# Patient Record
Sex: MALE | Race: BLACK OR AFRICAN AMERICAN | NOT HISPANIC OR LATINO | URBAN - METROPOLITAN AREA
[De-identification: names, ages, dates, MRNs, and addresses within clinical notes are randomized per-mention and may not be internally consistent; named-entity substitution may affect disease eponyms.]

---

## 2017-01-11 ENCOUNTER — INPATIENT (INPATIENT)
Facility: HOSPITAL | Age: 57
LOS: 0 days | Discharge: HOME | End: 2017-01-12
Attending: INTERNAL MEDICINE | Admitting: INTERNAL MEDICINE
Payer: COMMERCIAL

## 2017-01-11 VITALS
HEART RATE: 74 BPM | OXYGEN SATURATION: 98 % | RESPIRATION RATE: 16 BRPM | DIASTOLIC BLOOD PRESSURE: 121 MMHG | TEMPERATURE: 98 F | SYSTOLIC BLOOD PRESSURE: 177 MMHG

## 2017-01-11 DIAGNOSIS — I20.0 UNSTABLE ANGINA: ICD-10-CM

## 2017-01-11 DIAGNOSIS — Z41.9 ENCOUNTER FOR PROCEDURE FOR PURPOSES OTHER THAN REMEDYING HEALTH STATE, UNSPECIFIED: Chronic | ICD-10-CM

## 2017-01-11 DIAGNOSIS — Z41.8 ENCOUNTER FOR OTHER PROCEDURES FOR PURPOSES OTHER THAN REMEDYING HEALTH STATE: ICD-10-CM

## 2017-01-11 DIAGNOSIS — E11.9 TYPE 2 DIABETES MELLITUS WITHOUT COMPLICATIONS: ICD-10-CM

## 2017-01-11 DIAGNOSIS — R07.9 CHEST PAIN, UNSPECIFIED: ICD-10-CM

## 2017-01-11 DIAGNOSIS — N40.0 BENIGN PROSTATIC HYPERPLASIA WITHOUT LOWER URINARY TRACT SYMPTOMS: Chronic | ICD-10-CM

## 2017-01-11 LAB
ALBUMIN SERPL ELPH-MCNC: 4 G/DL — SIGNIFICANT CHANGE UP (ref 3.3–5)
ALP SERPL-CCNC: 89 U/L — SIGNIFICANT CHANGE UP (ref 40–120)
ALT FLD-CCNC: 25 U/L — SIGNIFICANT CHANGE UP (ref 4–41)
APTT BLD: 32.3 SEC — SIGNIFICANT CHANGE UP (ref 27.5–37.4)
AST SERPL-CCNC: 25 U/L — SIGNIFICANT CHANGE UP (ref 4–40)
BASOPHILS # BLD AUTO: 0.03 K/UL — SIGNIFICANT CHANGE UP (ref 0–0.2)
BASOPHILS NFR BLD AUTO: 0.3 % — SIGNIFICANT CHANGE UP (ref 0–2)
BILIRUB SERPL-MCNC: 0.3 MG/DL — SIGNIFICANT CHANGE UP (ref 0.2–1.2)
BUN SERPL-MCNC: 15 MG/DL — SIGNIFICANT CHANGE UP (ref 7–23)
CALCIUM SERPL-MCNC: 9.6 MG/DL — SIGNIFICANT CHANGE UP (ref 8.4–10.5)
CHLORIDE SERPL-SCNC: 101 MMOL/L — SIGNIFICANT CHANGE UP (ref 98–107)
CHOLEST SERPL-MCNC: 185 MG/DL — SIGNIFICANT CHANGE UP (ref 120–199)
CK MB BLD-MCNC: 35 NG/ML — HIGH (ref 1–6.6)
CK MB BLD-MCNC: 4.43 NG/ML — SIGNIFICANT CHANGE UP (ref 1–6.6)
CK MB BLD-MCNC: 8.5 — HIGH (ref 0–2.5)
CK SERPL-CCNC: 218 U/L — HIGH (ref 30–200)
CK SERPL-CCNC: 411 U/L — HIGH (ref 30–200)
CO2 SERPL-SCNC: 27 MMOL/L — SIGNIFICANT CHANGE UP (ref 22–31)
CREAT SERPL-MCNC: 1.1 MG/DL — SIGNIFICANT CHANGE UP (ref 0.5–1.3)
EOSINOPHIL # BLD AUTO: 0.42 K/UL — SIGNIFICANT CHANGE UP (ref 0–0.5)
EOSINOPHIL NFR BLD AUTO: 3.9 % — SIGNIFICANT CHANGE UP (ref 0–6)
GLUCOSE SERPL-MCNC: 165 MG/DL — HIGH (ref 70–99)
HBA1C BLD-MCNC: 7.3 % — HIGH (ref 4–5.6)
HCT VFR BLD CALC: 45.2 % — SIGNIFICANT CHANGE UP (ref 39–50)
HDLC SERPL-MCNC: 42 MG/DL — SIGNIFICANT CHANGE UP (ref 35–55)
HGB BLD-MCNC: 15.4 G/DL — SIGNIFICANT CHANGE UP (ref 13–17)
IMM GRANULOCYTES NFR BLD AUTO: 0.5 % — SIGNIFICANT CHANGE UP (ref 0–1.5)
INR BLD: 1.03 — SIGNIFICANT CHANGE UP (ref 0.87–1.18)
LIPID PNL WITH DIRECT LDL SERPL: 133 MG/DL — SIGNIFICANT CHANGE UP
LYMPHOCYTES # BLD AUTO: 2.35 K/UL — SIGNIFICANT CHANGE UP (ref 1–3.3)
LYMPHOCYTES # BLD AUTO: 21.9 % — SIGNIFICANT CHANGE UP (ref 13–44)
MCHC RBC-ENTMCNC: 29.2 PG — SIGNIFICANT CHANGE UP (ref 27–34)
MCHC RBC-ENTMCNC: 34.1 % — SIGNIFICANT CHANGE UP (ref 32–36)
MCV RBC AUTO: 85.8 FL — SIGNIFICANT CHANGE UP (ref 80–100)
MONOCYTES # BLD AUTO: 0.6 K/UL — SIGNIFICANT CHANGE UP (ref 0–0.9)
MONOCYTES NFR BLD AUTO: 5.6 % — SIGNIFICANT CHANGE UP (ref 2–14)
NEUTROPHILS # BLD AUTO: 7.29 K/UL — SIGNIFICANT CHANGE UP (ref 1.8–7.4)
NEUTROPHILS NFR BLD AUTO: 67.8 % — SIGNIFICANT CHANGE UP (ref 43–77)
PLATELET # BLD AUTO: 249 K/UL — SIGNIFICANT CHANGE UP (ref 150–400)
PMV BLD: 10.1 FL — SIGNIFICANT CHANGE UP (ref 7–13)
POTASSIUM SERPL-MCNC: 3.6 MMOL/L — SIGNIFICANT CHANGE UP (ref 3.5–5.3)
POTASSIUM SERPL-SCNC: 3.6 MMOL/L — SIGNIFICANT CHANGE UP (ref 3.5–5.3)
PROT SERPL-MCNC: 7 G/DL — SIGNIFICANT CHANGE UP (ref 6–8.3)
PROTHROM AB SERPL-ACNC: 11.8 SEC — SIGNIFICANT CHANGE UP (ref 10–13.1)
RBC # BLD: 5.27 M/UL — SIGNIFICANT CHANGE UP (ref 4.2–5.8)
RBC # FLD: 12.4 % — SIGNIFICANT CHANGE UP (ref 10.3–14.5)
SODIUM SERPL-SCNC: 143 MMOL/L — SIGNIFICANT CHANGE UP (ref 135–145)
TRIGL SERPL-MCNC: 108 MG/DL — SIGNIFICANT CHANGE UP (ref 10–149)
TROPONIN T SERPL-MCNC: 0.08 NG/ML — HIGH (ref 0–0.06)
TROPONIN T SERPL-MCNC: 0.22 NG/ML — HIGH (ref 0–0.06)
WBC # BLD: 10.74 K/UL — HIGH (ref 3.8–10.5)
WBC # FLD AUTO: 10.74 K/UL — HIGH (ref 3.8–10.5)

## 2017-01-11 PROCEDURE — 93010 ELECTROCARDIOGRAM REPORT: CPT

## 2017-01-11 PROCEDURE — 71020: CPT | Mod: 26

## 2017-01-11 RX ORDER — TICAGRELOR 90 MG/1
90 TABLET ORAL
Qty: 0 | Refills: 0 | Status: DISCONTINUED | OUTPATIENT
Start: 2017-01-12 | End: 2017-01-12

## 2017-01-11 RX ORDER — DEXTROSE 50 % IN WATER 50 %
25 SYRINGE (ML) INTRAVENOUS ONCE
Qty: 0 | Refills: 0 | Status: DISCONTINUED | OUTPATIENT
Start: 2017-01-11 | End: 2017-01-12

## 2017-01-11 RX ORDER — INSULIN LISPRO 100/ML
VIAL (ML) SUBCUTANEOUS AT BEDTIME
Qty: 0 | Refills: 0 | Status: DISCONTINUED | OUTPATIENT
Start: 2017-01-11 | End: 2017-01-12

## 2017-01-11 RX ORDER — NITROGLYCERIN 6.5 MG
0.4 CAPSULE, EXTENDED RELEASE ORAL ONCE
Qty: 0 | Refills: 0 | Status: COMPLETED | OUTPATIENT
Start: 2017-01-11 | End: 2017-01-11

## 2017-01-11 RX ORDER — INSULIN LISPRO 100/ML
VIAL (ML) SUBCUTANEOUS
Qty: 0 | Refills: 0 | Status: DISCONTINUED | OUTPATIENT
Start: 2017-01-11 | End: 2017-01-12

## 2017-01-11 RX ORDER — SODIUM CHLORIDE 9 MG/ML
1000 INJECTION INTRAMUSCULAR; INTRAVENOUS; SUBCUTANEOUS
Qty: 0 | Refills: 0 | Status: DISCONTINUED | OUTPATIENT
Start: 2017-01-11 | End: 2017-01-12

## 2017-01-11 RX ORDER — GLUCAGON INJECTION, SOLUTION 0.5 MG/.1ML
1 INJECTION, SOLUTION SUBCUTANEOUS ONCE
Qty: 0 | Refills: 0 | Status: DISCONTINUED | OUTPATIENT
Start: 2017-01-11 | End: 2017-01-12

## 2017-01-11 RX ORDER — DEXTROSE 50 % IN WATER 50 %
1 SYRINGE (ML) INTRAVENOUS ONCE
Qty: 0 | Refills: 0 | Status: DISCONTINUED | OUTPATIENT
Start: 2017-01-11 | End: 2017-01-12

## 2017-01-11 RX ORDER — DEXTROSE 50 % IN WATER 50 %
12.5 SYRINGE (ML) INTRAVENOUS ONCE
Qty: 0 | Refills: 0 | Status: DISCONTINUED | OUTPATIENT
Start: 2017-01-11 | End: 2017-01-12

## 2017-01-11 RX ORDER — ASPIRIN/CALCIUM CARB/MAGNESIUM 324 MG
81 TABLET ORAL DAILY
Qty: 0 | Refills: 0 | Status: DISCONTINUED | OUTPATIENT
Start: 2017-01-12 | End: 2017-01-12

## 2017-01-11 RX ORDER — ATORVASTATIN CALCIUM 80 MG/1
80 TABLET, FILM COATED ORAL AT BEDTIME
Qty: 0 | Refills: 0 | Status: DISCONTINUED | OUTPATIENT
Start: 2017-01-11 | End: 2017-01-12

## 2017-01-11 RX ADMIN — ATORVASTATIN CALCIUM 80 MILLIGRAM(S): 80 TABLET, FILM COATED ORAL at 20:56

## 2017-01-11 RX ADMIN — Medication 0.4 MILLIGRAM(S): at 04:53

## 2017-01-11 RX ADMIN — SODIUM CHLORIDE 75 MILLILITER(S): 9 INJECTION INTRAMUSCULAR; INTRAVENOUS; SUBCUTANEOUS at 17:18

## 2017-01-11 RX ADMIN — Medication: at 11:19

## 2017-01-11 NOTE — ED PROVIDER NOTE - ATTENDING CONTRIBUTION TO CARE
[place] 56M PMH DM p/w chest burning, feeling hot, intermittent since last night. In ED received nitro x1, now pain free. No other complaints. Vitals and exam wnl. EKG subtle STD.  Ddx: Concern for ACS. Clinically not dissecting/PE/tamponade/PTX/myocarditis/perf  CBC, cmp, CE, CXR, asa, reassess. Likely admit vs. obs.

## 2017-01-11 NOTE — ED PROVIDER NOTE - MEDICAL DECISION MAKING DETAILS
56 yr old with hx of DM presents with chest pain. Pt states he ate spicy dinner at about 930 pm and then developed burning, tightness across his chest. Pt took tums, pain went away. Pain returned at 230 am, waking him up from his sleep, pain was radiating to back.- concerning for CAD: ekg, cbc, cmp, ce, cxray, nitro for pain

## 2017-01-11 NOTE — ED PROVIDER NOTE - NS ED ATTENDING STATEMENT MOD
I have personally performed a face to face diagnostic evaluation on this patient. I have reviewed the PA note and agree with the history, exam, and plan of care, except as noted. I have personally seen and examined this patient. I have fully participated in the care of this patient. I have reviewed all pertinent clinical information, including history physical exam, plan and the Resident's note and agree except as noted

## 2017-01-11 NOTE — ED ADULT NURSE NOTE - OBJECTIVE STATEMENT
Patient received in room #14 c/o chest pain in upper chest radiating to back. A&Ox3. Patient reports pain started after eating spicy food for dinner at around 9pm. Patient states he went to sleep and the pain woke him up from his sleep at around 3am. Patient reports taking aspirin with some relief. Denies palpitations, sob, fevers, chills, N/V, headaches. VS as noted. NSR on CM. 18G IV Placed in left ac, labs drawn and sent. Will monitor.

## 2017-01-11 NOTE — H&P ADULT. - NEGATIVE CARDIOVASCULAR SYMPTOMS
no orthopnea/no paroxysmal nocturnal dyspnea/no dyspnea on exertion/no palpitations/no claudication/no peripheral edema

## 2017-01-11 NOTE — ED ADULT TRIAGE NOTE - CHIEF COMPLAINT QUOTE
Pt brought in by [facility] c/o chest pain that radiating to back x 8 hours ( dinner time- had spicy chinese food). Took Tums then - pain went away but has now returned waking him from sleep. Pt received  243mg Aspirin ( took 81 mg prior to EMS arrival and then EMS gave 162 mg). EKG in progress. Pt hypertensive in triage.

## 2017-01-11 NOTE — ED ADULT NURSE REASSESSMENT NOTE - NS ED NURSE REASSESS COMMENT FT1
Pt received this am awake and alert. Pt denies any sob respirations non labored 02 sat 99%. Pt denies chest pain NSR on monitor. Pt asking to eat tolerating breakfast no nausea or vomiting noted. Pt awaiting floor bed.

## 2017-01-11 NOTE — H&P ADULT. - RS GEN PE MLT RESP DETAILS PC
airway patent/no chest wall tenderness/respirations non-labored/clear to auscultation bilaterally/breath sounds equal/good air movement

## 2017-01-11 NOTE — H&P ADULT. - PROBLEM SELECTOR PLAN 1
tele monitor   cardiac enzymes x 3  Serial EKGs  DASH 1800 ADA diet  continue ASA  FLP, HgA1c  Will trend Trevin for now. W  Stress VS Cardiac cath

## 2017-01-11 NOTE — H&P ADULT. - HISTORY OF PRESENT ILLNESS
57 yo male with PMHx of DM and BPH p/w chest since last night. Pt at his dinner around 9pm (his usual food with spices). After, he sat down in the car and felt chest chest pain. Chest pain described as burning in bilateral chest, 6/10, radiating to the back, non-pleuritic in nature. When he got up from the car and walked 2 block his chest pain worsened, 9/10 with diaphoresis. He asked his wife to drive him home. Chest pain lasted for 30 mins, until his wife gave him tums milk. He went to sleep and at 2am this morning the same chest pain woke him from sleep. Pt took 81mg of ASA. He called EMS and they gave him additional 2 baby aspirins which improved his pain. In E.D. he was given Nitro SL which totally alleviated  his pain. Pt denies fever, chills, SOB, palpitations, nausea, vomiting or abdominal pain. Pt never had this pain before nor had any ischemic work up done before.    On Admission pt asymptomatic.

## 2017-01-11 NOTE — ED PROVIDER NOTE - PROGRESS NOTE DETAILS
Positive trops of 0.08. Pt not having pain right now. Pt admitted to tele doc of the day-  at this time for further workup. Discussed to give plavix or heparin- advise not to give at this time. Tele PA text paged. Vitals wnl, pain free, repeat EKG unchanged.

## 2017-01-11 NOTE — H&P ADULT. - ASSESSMENT
57 yo male with PMHx of DM p/w chest pain at rest worsened on exertion last night, admitted to tele for unstable angina.

## 2017-01-11 NOTE — ED ADULT NURSE NOTE - CHIEF COMPLAINT QUOTE
Pt brought in by [facility] EMS c/o chest pain that radiating to back x 8 hours ( dinner time- had spicy chinese food). Took Tums then - pain went away but has now returned waking him from sleep. Pt received  243mg Aspirin ( took 81 mg prior to EMS arrival and then EMS gave 162 mg). EKG in progress. Pt hypertensive in triage.

## 2017-01-12 VITALS — SYSTOLIC BLOOD PRESSURE: 127 MMHG | DIASTOLIC BLOOD PRESSURE: 91 MMHG | HEART RATE: 91 BPM

## 2017-01-12 LAB
BUN SERPL-MCNC: 15 MG/DL — SIGNIFICANT CHANGE UP (ref 7–23)
CALCIUM SERPL-MCNC: 9.3 MG/DL — SIGNIFICANT CHANGE UP (ref 8.4–10.5)
CHLORIDE SERPL-SCNC: 103 MMOL/L — SIGNIFICANT CHANGE UP (ref 98–107)
CO2 SERPL-SCNC: 21 MMOL/L — LOW (ref 22–31)
CREAT SERPL-MCNC: 1.08 MG/DL — SIGNIFICANT CHANGE UP (ref 0.5–1.3)
GLUCOSE SERPL-MCNC: 134 MG/DL — HIGH (ref 70–99)
HBA1C BLD-MCNC: 7.1 % — HIGH (ref 4–5.6)
HCT VFR BLD CALC: 43.6 % — SIGNIFICANT CHANGE UP (ref 39–50)
HGB BLD-MCNC: 15 G/DL — SIGNIFICANT CHANGE UP (ref 13–17)
MCHC RBC-ENTMCNC: 29.3 PG — SIGNIFICANT CHANGE UP (ref 27–34)
MCHC RBC-ENTMCNC: 34.4 % — SIGNIFICANT CHANGE UP (ref 32–36)
MCV RBC AUTO: 85.2 FL — SIGNIFICANT CHANGE UP (ref 80–100)
PLATELET # BLD AUTO: 251 K/UL — SIGNIFICANT CHANGE UP (ref 150–400)
PMV BLD: 10.2 FL — SIGNIFICANT CHANGE UP (ref 7–13)
POTASSIUM SERPL-MCNC: 3.7 MMOL/L — SIGNIFICANT CHANGE UP (ref 3.5–5.3)
POTASSIUM SERPL-SCNC: 3.7 MMOL/L — SIGNIFICANT CHANGE UP (ref 3.5–5.3)
RBC # BLD: 5.12 M/UL — SIGNIFICANT CHANGE UP (ref 4.2–5.8)
RBC # FLD: 12.5 % — SIGNIFICANT CHANGE UP (ref 10.3–14.5)
SODIUM SERPL-SCNC: 140 MMOL/L — SIGNIFICANT CHANGE UP (ref 135–145)
WBC # BLD: 11.58 K/UL — HIGH (ref 3.8–10.5)
WBC # FLD AUTO: 11.58 K/UL — HIGH (ref 3.8–10.5)

## 2017-01-12 PROCEDURE — 93931 UPPER EXTREMITY STUDY: CPT | Mod: 26,RT

## 2017-01-12 RX ORDER — LISINOPRIL 2.5 MG/1
1 TABLET ORAL
Qty: 30 | Refills: 0
Start: 2017-01-12 | End: 2017-02-11

## 2017-01-12 RX ORDER — TICAGRELOR 90 MG/1
1 TABLET ORAL
Qty: 180 | Refills: 3
Start: 2017-01-12 | End: 2018-01-06

## 2017-01-12 RX ORDER — METOPROLOL TARTRATE 50 MG
1 TABLET ORAL
Qty: 30 | Refills: 0
Start: 2017-01-12 | End: 2017-02-11

## 2017-01-12 RX ORDER — METOPROLOL TARTRATE 50 MG
25 TABLET ORAL DAILY
Qty: 0 | Refills: 0 | Status: DISCONTINUED | OUTPATIENT
Start: 2017-01-12 | End: 2017-01-12

## 2017-01-12 RX ORDER — TICAGRELOR 90 MG/1
1 TABLET ORAL
Qty: 180 | Refills: 0 | OUTPATIENT
Start: 2017-01-12 | End: 2017-04-12

## 2017-01-12 RX ORDER — ASPIRIN/CALCIUM CARB/MAGNESIUM 324 MG
1 TABLET ORAL
Qty: 30 | Refills: 0
Start: 2017-01-12 | End: 2017-02-11

## 2017-01-12 RX ORDER — ATORVASTATIN CALCIUM 80 MG/1
1 TABLET, FILM COATED ORAL
Qty: 30 | Refills: 0
Start: 2017-01-12 | End: 2017-02-11

## 2017-01-12 RX ORDER — METFORMIN HYDROCHLORIDE 850 MG/1
1 TABLET ORAL
Qty: 0 | Refills: 0 | COMMUNITY

## 2017-01-12 RX ORDER — LISINOPRIL 2.5 MG/1
2.5 TABLET ORAL DAILY
Qty: 0 | Refills: 0 | Status: DISCONTINUED | OUTPATIENT
Start: 2017-01-12 | End: 2017-01-12

## 2017-01-12 RX ADMIN — TICAGRELOR 90 MILLIGRAM(S): 90 TABLET ORAL at 17:16

## 2017-01-12 RX ADMIN — Medication 81 MILLIGRAM(S): at 11:49

## 2017-01-12 RX ADMIN — Medication: at 12:20

## 2017-01-12 RX ADMIN — Medication 25 MILLIGRAM(S): at 12:20

## 2017-01-12 RX ADMIN — TICAGRELOR 90 MILLIGRAM(S): 90 TABLET ORAL at 05:30

## 2017-01-12 NOTE — DISCHARGE NOTE ADULT - CARE PLAN
Principal Discharge DX:	NSTEMI (non-ST elevated myocardial infarction)  Goal:	Continue Aspirin, Brilinta, Metoprolol, Lisinopril and Lipitor to maintain heart health and prevent progression of coronary artery disease.  Instructions for follow-up, activity and diet:	Follow up with your Cardiologist for further monitoring in 1-2 weeks. Please call to arrange appointment. You need to take your medications as prescribed every day to prevent the stents in your coronary arteries from becoming blocked (Aspirin and Brilinta).  Secondary Diagnosis:	DM (diabetes mellitus)  Instructions for follow-up, activity and diet:	Follow up with your primary care physician for further monitoring in 1-2 weeks. Please call to arrange appointment. Continue diet modification. Avoid complex carbohydrates such as bread, pasta, cereal, white rice, white potatoes, etc. Avoid concentrated sugar as found in desserts, candy, soda, juice, etc. Consume a diet based on lean protein (chicken, fish) and vegetables. Principal Discharge DX:	NSTEMI (non-ST elevated myocardial infarction)  Goal:	Continue Aspirin, Brilinta, Metoprolol, Lisinopril and Lipitor to maintain heart health and prevent progression of coronary artery disease.  Instructions for follow-up, activity and diet:	Follow up with your Cardiologist, Dr. Menjivar for further monitoring in 1-2 weeks. Please call to arrange appointment. You need to take your medications as prescribed every day to prevent the stents in your coronary arteries from becoming blocked (Aspirin and Brilinta).  Secondary Diagnosis:	DM (diabetes mellitus)  Goal:	Continue metformin (resume 48hrs after angiogram). Goal HgbA1C <6%  Instructions for follow-up, activity and diet:	Follow up with your primary care physician for further monitoring in 1-2 weeks. Please call to arrange appointment. Continue diet modification. Avoid complex carbohydrates such as bread, pasta, cereal, white rice, white potatoes, etc. Avoid concentrated sugar as found in desserts, candy, soda, juice, etc. Consume a diet based on lean protein (chicken, fish) and vegetables.

## 2017-01-12 NOTE — DISCHARGE NOTE ADULT - HOSPITAL COURSE
57 yo male with PMHx of DM and BPH p/w chest since last night. Pt at his dinner around 9pm (his usual food with spices). After, he sat down in the car and felt chest pain. Chest pain described as burning in bilateral chest, 6/10, radiating to the back, non-pleuritic in nature. When he got up from the car and walked 2 block his chest pain worsened, 9/10 with diaphoresis. He asked his wife to drive him home. Chest pain lasted for 30 mins, until his wife gave him Tums milk. He went to sleep and at 2am this morning the same chest pain woke him from sleep. Pt took 81mg of ASA. He called EMS and they gave him additional 2 baby aspirins which improved his pain. In E.D. he was given Nitro SL which totally alleviated  his pain. Pt denies fever, chills, SOB, palpitations, nausea, vomiting or abdominal pain. Pt never had this pain before nor had any ischemic work up done before.    Upon admission, patient's cardiac enzymes were positive.  Chest x-ray showed clear lungs. 57 yo male with PMHx of DM and BPH p/w chest since last night. Pt at his dinner around 9pm (his usual food with spices). After, he sat down in the car and felt chest pain. Chest pain described as burning in bilateral chest, 6/10, radiating to the back, non-pleuritic in nature. When he got up from the car and walked 2 block his chest pain worsened, 9/10 with diaphoresis. He asked his wife to drive him home. Chest pain lasted for 30 mins, until his wife gave him Tums milk. He went to sleep and at 2am this morning the same chest pain woke him from sleep. Pt took 81mg of ASA. He called EMS and they gave him additional 2 baby aspirins which improved his pain. In E.D. he was given Nitro SL which totally alleviated  his pain. Pt denies fever, chills, SOB, palpitations, nausea, vomiting or abdominal pain. Pt never had this pain before nor had any ischemic work up done before.    On admission: EKG: NSR @ 80 bpm with Poor R-wave progression in V1-V6. Pt ruled in for NSTEMI and was taken to cath lab. (Trevin x1: trop 0.08, . Trevin x2: trop 0.22, .) CXR: Clear lungs. No pleural effusions or pneumothorax. Cardiac and mediastinal silhouettes within normal limits. Trachea midline. Unremarkable osseous structures.    On 1/11: Pt underwent LHC: LAD 95% x1 SUBHA, prox LCx 95% x1 SUBHA, prox RCA 70%, EF 60%, LVEDP 10, RRA accessed, Start 81mg ASA daily and Brilinta 90mg BID. Pt noted with small hematoma at RRB site. R arterial ultrasound: No pseudoaneurysm. No evidence of occlusive arterial disease in the  visualized right upper extremity.    Per Dr. Menjivar, no hematoma at RRA site, Pt medically cleared for discharge to home with follow up noted above. 55 yo male with PMHx of DM and BPH p/w chest since last night. Pt at his dinner around 9pm (his usual food with spices). After, he sat down in the car and felt chest pain. Chest pain described as burning in bilateral chest, 6/10, radiating to the back, non-pleuritic in nature. When he got up from the car and walked 2 block his chest pain worsened, 9/10 with diaphoresis. He asked his wife to drive him home. Chest pain lasted for 30 mins, until his wife gave him Tums milk. He went to sleep and at 2am this morning the same chest pain woke him from sleep. Pt took 81mg of ASA. He called EMS and they gave him additional 2 baby aspirins which improved his pain. In E.D. he was given Nitro SL which totally alleviated  his pain. Pt denies fever, chills, SOB, palpitations, nausea, vomiting or abdominal pain. Pt never had this pain before nor had any ischemic work up done before.    On admission: EKG: NSR @ 80 bpm with Poor R-wave progression in V1-V6. Pt ruled in for NSTEMI and was taken to cath lab. (Trevin x1: trop 0.08, . Trevin x2: trop 0.22, .) CXR: Clear lungs. No pleural effusions or pneumothorax. Cardiac and mediastinal silhouettes within normal limits. Trachea midline. Unremarkable osseous structures.    On 1/11: Pt underwent LHC: LAD 95% x1 SUBHA, prox LCx 95% x1 SUBHA, prox RCA 70%, EF 60%, LVEDP 10, RRA accessed, Start 81mg ASA daily and Brilinta 90mg BID. Pt noted with small hematoma at RRB site. Ecchymosis marked surround site and extending onto forearm. R arterial ultrasound: No pseudoaneurysm. No evidence of occlusive arterial disease in the visualized right upper extremity.    Per Dr. Menjivar, no hematoma or pseudoaneurysm noted on u/s, Pt medically cleared for discharge to home with follow up noted above. Pt required prior auth for Brilinta, letter of medical necessity provided by Dr. Menjivar and faxed to insurance company, approval obtained.

## 2017-01-12 NOTE — DISCHARGE NOTE ADULT - ADDITIONAL INSTRUCTIONS
No heavy lifting greater than 5-10lbs or repetitive movements with Right wrist for 1 week. No strenuous activity for 3 weeks. Monitor site of procedure for any redness, swelling, bleeding and notify your doctor. Monitor cardiac catheterization site for signs of bleeding, increased bruising, swelling, or discharge. If you experience any of these symptoms, please follow up with your primary care physician or return to the hospital immediately. Do not submerge the site in water (bathe or swim). You may shower. No strenuous activity for 3 weeks. Do not drive for 48hrs following angiogram.

## 2017-01-12 NOTE — DISCHARGE NOTE ADULT - PLAN OF CARE
Follow up with your Cardiologist for further monitoring in 1-2 weeks. Please call to arrange appointment. You need to take your medications as prescribed every day to prevent the stents in your coronary arteries from becoming blocked (Aspirin and Brilinta). Follow up with your primary care physician for further monitoring in 1-2 weeks. Please call to arrange appointment. Continue diet modification. Avoid complex carbohydrates such as bread, pasta, cereal, white rice, white potatoes, etc. Avoid concentrated sugar as found in desserts, candy, soda, juice, etc. Consume a diet based on lean protein (chicken, fish) and vegetables. Continue Aspirin, Brilinta, Metoprolol, Lisinopril and Lipitor to maintain heart health and prevent progression of coronary artery disease. Continue metformin (resume 48hrs after angiogram). Goal HgbA1C <6% Follow up with your Cardiologist, Dr. Menjivar for further monitoring in 1-2 weeks. Please call to arrange appointment. You need to take your medications as prescribed every day to prevent the stents in your coronary arteries from becoming blocked (Aspirin and Brilinta).

## 2017-01-12 NOTE — DISCHARGE NOTE ADULT - NS MD DC FALL RISK RISK
For information on Fall & Injury Prevention, visit [email or web address]

## 2017-01-12 NOTE — DISCHARGE NOTE ADULT - CARE PROVIDER_API CALL
Karthik Menjivar (ARMANDO), Cardiology  [address] [address] [place] [address]  Phone: [phone]  Fax: [phone]

## 2017-01-12 NOTE — PROVIDER CONTACT NOTE (OTHER) - BACKGROUND
patient had hematoma at cath site following cath procedure last night, hematoma was relieved last night with manual pressure prior to coming to floor

## 2017-01-12 NOTE — PROVIDER CONTACT NOTE (OTHER) - SITUATION
received patient on change of shift. patient has area above cath site red and swollen. patient denies pain. patient with positive palpable pulses

## 2017-01-12 NOTE — DISCHARGE NOTE ADULT - INSTRUCTIONS
Low salt, Low fat, Low cholesterol, Diabetic Diet Low cholesterol diet. Salt restriction. 1800Kcal diabetic diet with carb restriction.

## 2018-02-12 NOTE — ED ADULT NURSE NOTE - FALLEN IN THE PAST
Patient Information     Patient Name MRN Angelo Natarajan [id number] Male 3/6/1927      Telephone Encounter by Cydney Marsh RN at 2017 11:37 AM     Author:  Cydney Marsh RN Service:  (none) Author Type:  NURS- Registered Nurse     Filed:  2017 11:38 AM Encounter Date:  2017 Status:  Signed     :  Cydney Marsh RN (NURS- Registered Nurse)            risperiDONE (RISPERDAL) 0.5 mg tablet  TAKE 1 TABLET BY MOUTH EVERY 6 HOURS IF NEEDED       Disp: 30 tablet Refills:     Class: eRx Start: 2017    For: Late onset Alzheimer's disease with behavioral disturbance  Documented:1 year ago  Last refill:2017  To be filled at: [id number] - [place], [place] - [address]: [phone]    Last visit with CHULA BELCHER was on: 11/15/2017 in [facility]  PCP:  Chula Belcher MD     Unable to complete prescription refill per RN Medication Refill Policy.................... CYDNEY MARSH RN ....................  2017   11:37 AM            
no

## 2018-06-29 ENCOUNTER — EMERGENCY (EMERGENCY)
Facility: HOSPITAL | Age: 58
LOS: 1 days | Discharge: HOME | End: 2018-06-29
Attending: EMERGENCY MEDICINE | Admitting: EMERGENCY MEDICINE
Payer: COMMERCIAL

## 2018-06-29 VITALS
RESPIRATION RATE: 16 BRPM | SYSTOLIC BLOOD PRESSURE: 130 MMHG | TEMPERATURE: 99 F | OXYGEN SATURATION: 100 % | DIASTOLIC BLOOD PRESSURE: 85 MMHG | HEART RATE: 57 BPM

## 2018-06-29 VITALS
OXYGEN SATURATION: 97 % | HEART RATE: 52 BPM | DIASTOLIC BLOOD PRESSURE: 72 MMHG | SYSTOLIC BLOOD PRESSURE: 126 MMHG | RESPIRATION RATE: 17 BRPM

## 2018-06-29 DIAGNOSIS — Z41.9 ENCOUNTER FOR PROCEDURE FOR PURPOSES OTHER THAN REMEDYING HEALTH STATE, UNSPECIFIED: Chronic | ICD-10-CM

## 2018-06-29 PROBLEM — E11.9 TYPE 2 DIABETES MELLITUS WITHOUT COMPLICATIONS: Chronic | Status: ACTIVE | Noted: 2017-01-11

## 2018-06-29 PROBLEM — N40.0 BENIGN PROSTATIC HYPERPLASIA WITHOUT LOWER URINARY TRACT SYMPTOMS: Chronic | Status: ACTIVE | Noted: 2017-01-11

## 2018-06-29 LAB
ALBUMIN SERPL ELPH-MCNC: 4.1 G/DL — SIGNIFICANT CHANGE UP (ref 3.3–5)
ALP SERPL-CCNC: 95 U/L — SIGNIFICANT CHANGE UP (ref 40–120)
ALT FLD-CCNC: 19 U/L — SIGNIFICANT CHANGE UP (ref 4–41)
APPEARANCE UR: CLEAR — SIGNIFICANT CHANGE UP
AST SERPL-CCNC: 21 U/L — SIGNIFICANT CHANGE UP (ref 4–40)
BASE EXCESS BLDV CALC-SCNC: 3 MMOL/L — SIGNIFICANT CHANGE UP
BASOPHILS # BLD AUTO: 0.04 K/UL — SIGNIFICANT CHANGE UP (ref 0–0.2)
BASOPHILS NFR BLD AUTO: 0.5 % — SIGNIFICANT CHANGE UP (ref 0–2)
BILIRUB SERPL-MCNC: 0.3 MG/DL — SIGNIFICANT CHANGE UP (ref 0.2–1.2)
BILIRUB UR-MCNC: NEGATIVE — SIGNIFICANT CHANGE UP
BLOOD GAS VENOUS - CREATININE: 1.12 MG/DL — SIGNIFICANT CHANGE UP (ref 0.5–1.3)
BLOOD UR QL VISUAL: NEGATIVE — SIGNIFICANT CHANGE UP
BUN SERPL-MCNC: 15 MG/DL — SIGNIFICANT CHANGE UP (ref 7–23)
CALCIUM SERPL-MCNC: 8.5 MG/DL — SIGNIFICANT CHANGE UP (ref 8.4–10.5)
CHLORIDE BLDV-SCNC: 101 MMOL/L — SIGNIFICANT CHANGE UP (ref 96–108)
CHLORIDE SERPL-SCNC: 97 MMOL/L — LOW (ref 98–107)
CO2 SERPL-SCNC: 26 MMOL/L — SIGNIFICANT CHANGE UP (ref 22–31)
COLOR SPEC: SIGNIFICANT CHANGE UP
CREAT SERPL-MCNC: 1.14 MG/DL — SIGNIFICANT CHANGE UP (ref 0.5–1.3)
CRP SERPL-MCNC: < 5 MG/L — SIGNIFICANT CHANGE UP
EOSINOPHIL # BLD AUTO: 0.33 K/UL — SIGNIFICANT CHANGE UP (ref 0–0.5)
EOSINOPHIL NFR BLD AUTO: 4 % — SIGNIFICANT CHANGE UP (ref 0–6)
ERYTHROCYTE [SEDIMENTATION RATE] IN BLOOD: 5 MM/HR — SIGNIFICANT CHANGE UP (ref 1–15)
GAS PNL BLDV: 133 MMOL/L — LOW (ref 136–146)
GLUCOSE BLDV-MCNC: 142 — HIGH (ref 70–99)
GLUCOSE SERPL-MCNC: 133 MG/DL — HIGH (ref 70–99)
GLUCOSE UR-MCNC: NEGATIVE — SIGNIFICANT CHANGE UP
HCO3 BLDV-SCNC: 25 MMOL/L — SIGNIFICANT CHANGE UP (ref 20–27)
HCT VFR BLD CALC: 40.6 % — SIGNIFICANT CHANGE UP (ref 39–50)
HCT VFR BLDV CALC: 44.9 % — SIGNIFICANT CHANGE UP (ref 39–51)
HGB BLD-MCNC: 13.6 G/DL — SIGNIFICANT CHANGE UP (ref 13–17)
HGB BLDV-MCNC: 14.7 G/DL — SIGNIFICANT CHANGE UP (ref 13–17)
IMM GRANULOCYTES # BLD AUTO: 0.07 # — SIGNIFICANT CHANGE UP
IMM GRANULOCYTES NFR BLD AUTO: 0.9 % — SIGNIFICANT CHANGE UP (ref 0–1.5)
KETONES UR-MCNC: NEGATIVE — SIGNIFICANT CHANGE UP
LACTATE BLDV-MCNC: 2.1 MMOL/L — HIGH (ref 0.5–2)
LEUKOCYTE ESTERASE UR-ACNC: NEGATIVE — SIGNIFICANT CHANGE UP
LYMPHOCYTES # BLD AUTO: 1.83 K/UL — SIGNIFICANT CHANGE UP (ref 1–3.3)
LYMPHOCYTES # BLD AUTO: 22.4 % — SIGNIFICANT CHANGE UP (ref 13–44)
MCHC RBC-ENTMCNC: 29.4 PG — SIGNIFICANT CHANGE UP (ref 27–34)
MCHC RBC-ENTMCNC: 33.5 % — SIGNIFICANT CHANGE UP (ref 32–36)
MCV RBC AUTO: 87.7 FL — SIGNIFICANT CHANGE UP (ref 80–100)
MONOCYTES # BLD AUTO: 0.47 K/UL — SIGNIFICANT CHANGE UP (ref 0–0.9)
MONOCYTES NFR BLD AUTO: 5.8 % — SIGNIFICANT CHANGE UP (ref 2–14)
NEUTROPHILS # BLD AUTO: 5.42 K/UL — SIGNIFICANT CHANGE UP (ref 1.8–7.4)
NEUTROPHILS NFR BLD AUTO: 66.4 % — SIGNIFICANT CHANGE UP (ref 43–77)
NITRITE UR-MCNC: NEGATIVE — SIGNIFICANT CHANGE UP
NRBC # FLD: 0 — SIGNIFICANT CHANGE UP
PCO2 BLDV: 51 MMHG — SIGNIFICANT CHANGE UP (ref 41–51)
PH BLDV: 7.36 PH — SIGNIFICANT CHANGE UP (ref 7.32–7.43)
PH UR: 7 — SIGNIFICANT CHANGE UP (ref 4.6–8)
PLATELET # BLD AUTO: 202 K/UL — SIGNIFICANT CHANGE UP (ref 150–400)
PMV BLD: 9.6 FL — SIGNIFICANT CHANGE UP (ref 7–13)
PO2 BLDV: 29 MMHG — LOW (ref 35–40)
POTASSIUM BLDV-SCNC: 4 MMOL/L — SIGNIFICANT CHANGE UP (ref 3.4–4.5)
POTASSIUM SERPL-MCNC: 4.2 MMOL/L — SIGNIFICANT CHANGE UP (ref 3.5–5.3)
POTASSIUM SERPL-SCNC: 4.2 MMOL/L — SIGNIFICANT CHANGE UP (ref 3.5–5.3)
PROT SERPL-MCNC: 6.8 G/DL — SIGNIFICANT CHANGE UP (ref 6–8.3)
PROT UR-MCNC: NEGATIVE MG/DL — SIGNIFICANT CHANGE UP
RBC # BLD: 4.63 M/UL — SIGNIFICANT CHANGE UP (ref 4.2–5.8)
RBC # FLD: 12 % — SIGNIFICANT CHANGE UP (ref 10.3–14.5)
RBC CASTS # UR COMP ASSIST: SIGNIFICANT CHANGE UP (ref 0–?)
SAO2 % BLDV: 46.9 % — LOW (ref 60–85)
SODIUM SERPL-SCNC: 135 MMOL/L — SIGNIFICANT CHANGE UP (ref 135–145)
SP GR SPEC: 1.01 — SIGNIFICANT CHANGE UP (ref 1–1.04)
UROBILINOGEN FLD QL: NORMAL MG/DL — SIGNIFICANT CHANGE UP
WBC # BLD: 8.16 K/UL — SIGNIFICANT CHANGE UP (ref 3.8–10.5)
WBC # FLD AUTO: 8.16 K/UL — SIGNIFICANT CHANGE UP (ref 3.8–10.5)
WBC UR QL: SIGNIFICANT CHANGE UP (ref 0–?)

## 2018-06-29 PROCEDURE — 99284 EMERGENCY DEPT VISIT MOD MDM: CPT | Mod: 25

## 2018-06-29 RX ORDER — LIDOCAINE 4 G/100G
1 CREAM TOPICAL
Qty: 5 | Refills: 0 | OUTPATIENT
Start: 2018-06-29

## 2018-06-29 RX ORDER — LIDOCAINE 4 G/100G
1 CREAM TOPICAL
Qty: 5 | Refills: 0
Start: 2018-06-29

## 2018-06-29 RX ORDER — LIDOCAINE 4 G/100G
1 CREAM TOPICAL ONCE
Qty: 0 | Refills: 0 | Status: COMPLETED | OUTPATIENT
Start: 2018-06-29 | End: 2018-06-29

## 2018-06-29 RX ORDER — CYCLOBENZAPRINE HYDROCHLORIDE 10 MG/1
1 TABLET, FILM COATED ORAL
Qty: 45 | Refills: 0 | OUTPATIENT
Start: 2018-06-29 | End: 2018-07-13

## 2018-06-29 RX ORDER — CYCLOBENZAPRINE HYDROCHLORIDE 10 MG/1
1 TABLET, FILM COATED ORAL
Qty: 45 | Refills: 0
Start: 2018-06-29 | End: 2018-07-13

## 2018-06-29 RX ORDER — IBUPROFEN 200 MG
400 TABLET ORAL ONCE
Qty: 0 | Refills: 0 | Status: COMPLETED | OUTPATIENT
Start: 2018-06-29 | End: 2018-06-29

## 2018-06-29 RX ADMIN — Medication 400 MILLIGRAM(S): at 06:24

## 2018-06-29 RX ADMIN — LIDOCAINE 1 PATCH: 4 CREAM TOPICAL at 06:24

## 2018-06-29 NOTE — ED ADULT NURSE NOTE - OBJECTIVE STATEMENT
58 yo male received in spot 16.  Pt is A&Ox4.  Pt c/o lower back pain.  Denies any recent trauma or heavy lifting.  Had an 1 episode of urinary incontinence.  No feeling of urinary retention, or saddle numbness.  18G L AC

## 2018-06-29 NOTE — ED PROVIDER NOTE - OBJECTIVE STATEMENT
Pt is a 57 Y M with hx of DM, HTN BPH, CAD with stents who presents with acute onset low back pain while sitting yesterday around 12 pm. Pt states that he has never had back pain like this before, was sitting and all of the sudden had pain in the bilateral lower back. He denies any recent trauma or heavy lifting. He says the pain doesn't go to his abdomen or up his flank. He says that tonight while he was in bed the pain was acutely more severe, he had an episode of urinary incontinence where he says that he noticed his leg was all wet with urine. He has had a prostate surgery but never had incontinence before. He denies symptoms of retention, he denies saddle anesthesia, he denies fecal incontinence. He denies history of smoking, no hematuria, no LE weakness, numbness, or tingling. He denies any fever, nausea, vomiting.

## 2018-06-29 NOTE — ED PROVIDER NOTE - PHYSICAL EXAMINATION
Rectal: Dr. Howard present for exam. No saddle anesthesia to light touch, normal rectal tone.  Neuro: A&Ox4, MS +5/5 in UE and LE BL, gross sensation intact in UE and LE BL  Back: No appreciable muscle spasm or increased tone, BL TTP over superior gluteal muscle masses, no midline TTP, no overlying skin changes.

## 2018-06-29 NOTE — ED PROVIDER NOTE - MEDICAL DECISION MAKING DETAILS
Pt 57 Y M with hx of DM, HTN, no smoking hx presents with acute onset low back pain while at rest, with one episode of urinary incontinence tonight that prompted him to come to ed. DDx: Cord compression, epidural abscess, muscle strain/sprain. Given episode of urinary incontinence there is high concern for serious pathology, however exam with no signs of cord compression given normal rectal tone, no saddle anesthesia and no other neuro findings. At this point it is reasonable to screen pt with ESR, CRP for diabetic abscess as possible cause, however given lack of fever highly doubt. Will give medications for msk cause at this point, if significantly improved, no other episodes of urinary symptoms, and pt agrees to close follow up will DC home. If cannot improve pain, significant lab abnormality, or more worrisome signs will admit for MRI. Pt 57 Y M with hx of DM, HTN, no smoking hx presents with acute onset low back pain while at rest, with one episode of urinary incontinence tonight that prompted him to come to ed. DDx: Cord compression, epidural abscess, nephrolithiasis, pyelonephritis, muscle strain/sprain. Given episode of urinary incontinence there is high concern for serious pathology, however exam with no signs of cord compression given normal rectal tone, no saddle anesthesia and no other neuro findings. At this point it is reasonable to screen pt with ESR, CRP for diabetic abscess as possible cause, however given lack of fever highly doubt. Will give medications for msk cause at this point, if significantly improved, no other episodes of urinary symptoms, and pt agrees to close follow up will DC home. If cannot improve pain, significant lab abnormality, or more worrisome signs will admit for MRI. Pt 57 Y M with hx of DM, HTN, no smoking hx presents with acute onset low back pain while at rest, with one episode of urinary incontinence tonight that prompted him to come to ed. DDx: Cord compression, epidural abscess, nephrolithiasis, pyelonephritis, muscle strain/sprain. Given episode of urinary incontinence there is high concern for serious pathology, however exam with no signs of cord compression given normal rectal tone, no saddle anesthesia and no other neuro findings. At this point it is reasonable to screen pt with ESR, CRP for diabetic abscess as possible cause, however given lack of fever highly doubt. Will give medications for msk cause at this point, if significantly improved, no other episodes of urinary symptoms, and pt agrees to close follow up will DC home. If cannot improve pain, significant lab abnormality, or more worrisome signs will admit for MRI. Symptoms improved with Valium and Lidoderm. To be discharged.

## 2018-06-29 NOTE — ED ADULT TRIAGE NOTE - CHIEF COMPLAINT QUOTE
pt arrives w/ c/o severe lower back. pt states he was sitting in a chair when the pain began. pt states he was able to get himself home but while in bed tonight could not move. pt states took Tylenol tonight. pt not ambulatory pt arrives w/ c/o severe lower back pain. pt states he was sitting in a chair when the pain began. pt states he was able to get himself home but while in bed tonight could not move. pt states took Tylenol tonight. pt not ambulatory

## 2018-06-29 NOTE — ED PROVIDER NOTE - ATTENDING CONTRIBUTION TO CARE
DR. SUMNER, ATTENDING MD-  I performed a face to face bedside interview with patient regarding history of present illness, review of symptoms and past medical history. I completed an independent physical exam.  I have discussed patient's plan of care with the resident.   Documentation as above in the note.   HPI: 57 Y M with hx of DM, HTN BPH, CAD with stents who presents with acute onset low back pain while sitting yesterday around 12 pm. Pt states that he has never had back pain like this before, was sitting and all of the sudden had pain in the bilateral lower back. He denies any recent trauma or heavy lifting. He says the pain doesn't go to his abdomen or up his flank. He says that tonight while he was in bed the pain was acutely more severe, he had an episode of urinary incontinence where he says that he noticed his leg was all wet with urine. He has had a prostate surgery but never had incontinence before. He denies symptoms of retention, he denies saddle anesthesia, he denies fecal incontinence. He denies history of smoking, no hematuria, no LE weakness, numbness, or tingling. He denies any fever, nausea, vomiting.  EXAM: NAD, lying supine, low back without signs trauma, no midline tenderness to palpation or stepoffs in T/L spine, tenderness to palpation in Right upper and left upper buttocks, no rash, rectal tone intact, sensation intact to perianal area. Moving BLE without pain exacerbation, pelvis and hips stable, abd soft nontender, equal pulses in all 4 ext and all warm and well perfused.   MDM: concern for MSK pain most likely, also consider cord compression given history but benign exam, also consider epidural abscess given DM status but also unlikely given no fever, sudden onset rather than gradual onset, and no history of IVDU or other risk factors. Will obtain labs, provide meds, and reassess. Will consider imaging if pain and symptoms do not resolve.

## 2018-06-29 NOTE — ED ADULT NURSE NOTE - CHIEF COMPLAINT QUOTE
pt arrives w/ c/o severe lower back pain. pt states he was sitting in a chair when the pain began. pt states he was able to get himself home but while in bed tonight could not move. pt states took Tylenol tonight. pt not ambulatory

## 2020-03-29 ENCOUNTER — INPATIENT (INPATIENT)
Facility: HOSPITAL | Age: 60
LOS: 9 days | Discharge: HOME | DRG: 871 | End: 2020-04-08
Attending: HOSPITALIST | Admitting: HOSPITALIST
Payer: COMMERCIAL

## 2020-03-29 VITALS
DIASTOLIC BLOOD PRESSURE: 96 MMHG | SYSTOLIC BLOOD PRESSURE: 142 MMHG | OXYGEN SATURATION: 91 % | TEMPERATURE: 99 F | RESPIRATION RATE: 22 BRPM | HEART RATE: 135 BPM | HEIGHT: 64 IN | WEIGHT: 151.02 LBS

## 2020-03-29 DIAGNOSIS — Z41.9 ENCOUNTER FOR PROCEDURE FOR PURPOSES OTHER THAN REMEDYING HEALTH STATE, UNSPECIFIED: Chronic | ICD-10-CM

## 2020-03-29 DIAGNOSIS — R09.02 HYPOXEMIA: ICD-10-CM

## 2020-03-29 LAB
ALBUMIN SERPL ELPH-MCNC: 4.2 G/DL — SIGNIFICANT CHANGE UP (ref 3.3–5)
ALP SERPL-CCNC: 102 U/L — SIGNIFICANT CHANGE UP (ref 40–120)
ALT FLD-CCNC: 32 U/L — SIGNIFICANT CHANGE UP (ref 10–45)
ANION GAP SERPL CALC-SCNC: 18 MMOL/L — HIGH (ref 5–17)
APPEARANCE UR: CLEAR — SIGNIFICANT CHANGE UP
APTT BLD: 29.8 SEC — SIGNIFICANT CHANGE UP (ref 27.5–36.3)
AST SERPL-CCNC: 20 U/L — SIGNIFICANT CHANGE UP (ref 10–40)
BACTERIA # UR AUTO: NEGATIVE — SIGNIFICANT CHANGE UP
BASE EXCESS BLDV CALC-SCNC: -0.2 MMOL/L — SIGNIFICANT CHANGE UP (ref -2–2)
BASOPHILS # BLD AUTO: 0 K/UL — SIGNIFICANT CHANGE UP (ref 0–0.2)
BASOPHILS NFR BLD AUTO: 0 % — SIGNIFICANT CHANGE UP (ref 0–2)
BILIRUB SERPL-MCNC: 0.5 MG/DL — SIGNIFICANT CHANGE UP (ref 0.2–1.2)
BILIRUB UR-MCNC: NEGATIVE — SIGNIFICANT CHANGE UP
BUN SERPL-MCNC: 12 MG/DL — SIGNIFICANT CHANGE UP (ref 7–23)
CA-I SERPL-SCNC: 1.13 MMOL/L — SIGNIFICANT CHANGE UP (ref 1.12–1.3)
CALCIUM SERPL-MCNC: 9 MG/DL — SIGNIFICANT CHANGE UP (ref 8.4–10.5)
CHLORIDE BLDV-SCNC: 101 MMOL/L — SIGNIFICANT CHANGE UP (ref 96–108)
CHLORIDE SERPL-SCNC: 96 MMOL/L — SIGNIFICANT CHANGE UP (ref 96–108)
CO2 BLDV-SCNC: 26 MMOL/L — SIGNIFICANT CHANGE UP (ref 22–30)
CO2 SERPL-SCNC: 21 MMOL/L — LOW (ref 22–31)
COLOR SPEC: COLORLESS — SIGNIFICANT CHANGE UP
CREAT SERPL-MCNC: 0.98 MG/DL — SIGNIFICANT CHANGE UP (ref 0.5–1.3)
CRP SERPL-MCNC: 0.52 MG/DL — HIGH (ref 0–0.4)
D DIMER BLD IA.RAPID-MCNC: <150 NG/ML DDU — SIGNIFICANT CHANGE UP
DIFF PNL FLD: ABNORMAL
EOSINOPHIL # BLD AUTO: 0 K/UL — SIGNIFICANT CHANGE UP (ref 0–0.5)
EOSINOPHIL NFR BLD AUTO: 0 % — SIGNIFICANT CHANGE UP (ref 0–6)
EPI CELLS # UR: 0 — SIGNIFICANT CHANGE UP
ERYTHROCYTE [SEDIMENTATION RATE] IN BLOOD: 12 MM/HR — SIGNIFICANT CHANGE UP (ref 0–20)
FERRITIN SERPL-MCNC: 126 NG/ML — SIGNIFICANT CHANGE UP (ref 30–400)
GAS PNL BLDV: 130 MMOL/L — LOW (ref 135–145)
GAS PNL BLDV: SIGNIFICANT CHANGE UP
GIANT PLATELETS BLD QL SMEAR: PRESENT — SIGNIFICANT CHANGE UP
GLUCOSE BLDV-MCNC: 309 MG/DL — HIGH (ref 70–99)
GLUCOSE SERPL-MCNC: 317 MG/DL — HIGH (ref 70–99)
GLUCOSE UR QL: ABNORMAL
HCO3 BLDV-SCNC: 25 MMOL/L — SIGNIFICANT CHANGE UP (ref 21–29)
HCT VFR BLD CALC: 47 % — SIGNIFICANT CHANGE UP (ref 39–50)
HCT VFR BLDA CALC: 51 % — HIGH (ref 39–50)
HGB BLD CALC-MCNC: 16.7 G/DL — SIGNIFICANT CHANGE UP (ref 13–17)
HGB BLD-MCNC: 15.6 G/DL — SIGNIFICANT CHANGE UP (ref 13–17)
HYALINE CASTS # UR AUTO: 0 /LPF — SIGNIFICANT CHANGE UP (ref 0–7)
INR BLD: 1.09 RATIO — SIGNIFICANT CHANGE UP (ref 0.88–1.16)
KETONES UR-MCNC: NEGATIVE — SIGNIFICANT CHANGE UP
LACTATE BLDV-MCNC: 3.3 MMOL/L — HIGH (ref 0.7–2)
LDH SERPL L TO P-CCNC: 203 U/L — SIGNIFICANT CHANGE UP (ref 50–242)
LEUKOCYTE ESTERASE UR-ACNC: NEGATIVE — SIGNIFICANT CHANGE UP
LG PLATELETS BLD QL AUTO: SLIGHT — SIGNIFICANT CHANGE UP
LYMPHOCYTES # BLD AUTO: 0.27 K/UL — LOW (ref 1–3.3)
LYMPHOCYTES # BLD AUTO: 4 % — LOW (ref 13–44)
MAGNESIUM SERPL-MCNC: 2 MG/DL — SIGNIFICANT CHANGE UP (ref 1.6–2.6)
MANUAL SMEAR VERIFICATION: SIGNIFICANT CHANGE UP
MCHC RBC-ENTMCNC: 28.7 PG — SIGNIFICANT CHANGE UP (ref 27–34)
MCHC RBC-ENTMCNC: 33.2 GM/DL — SIGNIFICANT CHANGE UP (ref 32–36)
MCV RBC AUTO: 86.4 FL — SIGNIFICANT CHANGE UP (ref 80–100)
METAMYELOCYTES # FLD: 1 % — HIGH (ref 0–0)
MONOCYTES # BLD AUTO: 0.4 K/UL — SIGNIFICANT CHANGE UP (ref 0–0.9)
MONOCYTES NFR BLD AUTO: 6 % — SIGNIFICANT CHANGE UP (ref 2–14)
NEUTROPHILS # BLD AUTO: 5.92 K/UL — SIGNIFICANT CHANGE UP (ref 1.8–7.4)
NEUTROPHILS NFR BLD AUTO: 82 % — HIGH (ref 43–77)
NEUTS BAND # BLD: 7 % — SIGNIFICANT CHANGE UP (ref 0–8)
NITRITE UR-MCNC: NEGATIVE — SIGNIFICANT CHANGE UP
NRBC # BLD: 0 /100 — SIGNIFICANT CHANGE UP (ref 0–0)
PCO2 BLDV: 43 MMHG — SIGNIFICANT CHANGE UP (ref 35–50)
PH BLDV: 7.38 — SIGNIFICANT CHANGE UP (ref 7.35–7.45)
PH UR: 7 — SIGNIFICANT CHANGE UP (ref 5–8)
PLAT MORPH BLD: ABNORMAL
PLATELET # BLD AUTO: 209 K/UL — SIGNIFICANT CHANGE UP (ref 150–400)
PO2 BLDV: 33 MMHG — SIGNIFICANT CHANGE UP (ref 25–45)
POTASSIUM BLDV-SCNC: 3.5 MMOL/L — SIGNIFICANT CHANGE UP (ref 3.5–5.3)
POTASSIUM SERPL-MCNC: 3.7 MMOL/L — SIGNIFICANT CHANGE UP (ref 3.5–5.3)
POTASSIUM SERPL-SCNC: 3.7 MMOL/L — SIGNIFICANT CHANGE UP (ref 3.5–5.3)
PROCALCITONIN SERPL-MCNC: 0.04 NG/ML — SIGNIFICANT CHANGE UP (ref 0.02–0.1)
PROT SERPL-MCNC: 7.8 G/DL — SIGNIFICANT CHANGE UP (ref 6–8.3)
PROT UR-MCNC: ABNORMAL
PROTHROM AB SERPL-ACNC: 12.5 SEC — SIGNIFICANT CHANGE UP (ref 10–12.9)
RBC # BLD: 5.44 M/UL — SIGNIFICANT CHANGE UP (ref 4.2–5.8)
RBC # FLD: 11.9 % — SIGNIFICANT CHANGE UP (ref 10.3–14.5)
RBC BLD AUTO: NORMAL — SIGNIFICANT CHANGE UP
RBC CASTS # UR COMP ASSIST: 0 /HPF — SIGNIFICANT CHANGE UP (ref 0–4)
SAO2 % BLDV: 57 % — LOW (ref 67–88)
SARS-COV-2 RNA SPEC QL NAA+PROBE: DETECTED
SODIUM SERPL-SCNC: 135 MMOL/L — SIGNIFICANT CHANGE UP (ref 135–145)
SP GR SPEC: 1.01 — LOW (ref 1.01–1.02)
UROBILINOGEN FLD QL: NEGATIVE — SIGNIFICANT CHANGE UP
WBC # BLD: 6.65 K/UL — SIGNIFICANT CHANGE UP (ref 3.8–10.5)
WBC # FLD AUTO: 6.65 K/UL — SIGNIFICANT CHANGE UP (ref 3.8–10.5)
WBC UR QL: 0 /HPF — SIGNIFICANT CHANGE UP (ref 0–5)

## 2020-03-29 PROCEDURE — 93010 ELECTROCARDIOGRAM REPORT: CPT

## 2020-03-29 PROCEDURE — 99285 EMERGENCY DEPT VISIT HI MDM: CPT

## 2020-03-29 PROCEDURE — 71045 X-RAY EXAM CHEST 1 VIEW: CPT | Mod: 26

## 2020-03-29 RX ORDER — CEFTRIAXONE 500 MG/1
1000 INJECTION, POWDER, FOR SOLUTION INTRAMUSCULAR; INTRAVENOUS ONCE
Refills: 0 | Status: COMPLETED | OUTPATIENT
Start: 2020-03-29 | End: 2020-03-29

## 2020-03-29 RX ORDER — ALBUTEROL 90 UG/1
2 AEROSOL, METERED ORAL ONCE
Refills: 0 | Status: COMPLETED | OUTPATIENT
Start: 2020-03-29 | End: 2020-03-29

## 2020-03-29 RX ORDER — SODIUM CHLORIDE 9 MG/ML
500 INJECTION, SOLUTION INTRAVENOUS
Refills: 0 | Status: DISCONTINUED | OUTPATIENT
Start: 2020-03-29 | End: 2020-03-30

## 2020-03-29 RX ORDER — AZITHROMYCIN 500 MG/1
500 TABLET, FILM COATED ORAL ONCE
Refills: 0 | Status: COMPLETED | OUTPATIENT
Start: 2020-03-29 | End: 2020-03-29

## 2020-03-29 RX ADMIN — AZITHROMYCIN 500 MILLIGRAM(S): 500 TABLET, FILM COATED ORAL at 22:02

## 2020-03-29 RX ADMIN — ALBUTEROL 2 PUFF(S): 90 AEROSOL, METERED ORAL at 16:00

## 2020-03-29 RX ADMIN — CEFTRIAXONE 100 MILLIGRAM(S): 500 INJECTION, POWDER, FOR SOLUTION INTRAMUSCULAR; INTRAVENOUS at 21:00

## 2020-03-29 NOTE — ED ADULT NURSE NOTE - NSIMPLEMENTINTERV_GEN_ALL_ED
Implemented All Universal Safety Interventions:  [place] to call system. Call bell, personal items and telephone within reach. Instruct patient to call for assistance. Room bathroom lighting operational. Non-slip footwear when patient is off stretcher. Physically safe environment: no spills, clutter or unnecessary equipment. Stretcher in lowest position, wheels locked, appropriate side rails in place.

## 2020-03-29 NOTE — ED PROVIDER NOTE - CARE PLAN
Principal Discharge DX:	Hypoxia  Secondary Diagnosis:	Respiratory distress Principal Discharge DX:	Infection due to 2019 novel coronavirus  Secondary Diagnosis:	Pneumonia due to 2019 novel coronavirus

## 2020-03-29 NOTE — ED PROVIDER NOTE - PHYSICAL EXAMINATION
Ifrah Jarrett DO.:   GENERAL: Patient awake alert NAD.  HEENT: NC/AT, Moist mucous membranes, PERRL, EOMI.  LUNGS: mild wheezing scattered  CARDIAC: RRR, no m/r/g.    ABDOMEN: Soft, NT, ND, No rebound, guarding. No CVA tenderness.   EXT: No edema. No calf tenderness.  MSK: No spinal tenderness, no pain with movement, no deformities.  NEURO: A&Ox3. Moving all extremities.  SKIN: Warm and dry. No rash.  PSYCH: Normal affect. Ifrah Jarrett DO.:   GENERAL: Patient awake alert NAD.  HEENT: NC/AT, Moist mucous membranes, PERRL, EOMI.  LUNGS: mild wheezing scattered, tachypneic   CARDIAC: regular rhythm, no m/r/g.  tachycardic   ABDOMEN: Soft, NT, ND, No rebound, guarding. No CVA tenderness.   EXT: No edema. No calf tenderness.  MSK: No spinal tenderness, no pain with movement, no deformities.  NEURO: A&Ox3. Moving all extremities.  SKIN: Warm and dry. No rash.  PSYCH: Normal affect.

## 2020-03-29 NOTE — ED PROVIDER NOTE - CLINICAL SUMMARY MEDICAL DECISION MAKING FREE TEXT BOX
59M p/w cough, SOB, fever. Concern for COVID vs. bacterial pna. WIll get labs, COVID test, cxr. Admit. 59M p/w cough, SOB, fever. Concern for COVID vs. bacterial pna. WIll get labs, COVID test, cxr. Admit.  Based on patient's history and physical exam, as well as the results of today's workup, I feel that patient warrants admission to the hospital for further workup/evaluation and continued management. I discussed the findings of today's workup with the patient and addressed the patient's questions and concerns. The patient was agreeable with admission. Our team spoke with the inpatient receiving team who accepted the patient for admission and subsequently took over the patient's care at the time of admission.

## 2020-03-29 NOTE — ED ADULT TRIAGE NOTE - BSA (M2)
Pt called with c/o brown and bright red vaginal bleeding last night and this morning.  When did your bleeding start? Last night and this morning  Is your bleeding very light (spotting) or is it like a period?spotting  Is the blood bright red or brownish?both  Have you had sexual intercourse recently?no  Have you had pain or cramping?no  Have you felt dizzy or faint?no  Pt reassured can be normal in early pregnancy.  Discussed cervix has a lot of blood flow going to it, so any disturbance--intercourse, straining with BM, cough, laugh, sneeze to hard--can cause some spotting.    Pt had a nurse navigator welcome visit on 11/6/17 and an HCG level of 1,615 on 11/6/17  Pt instructed to obtain another HCG level today and we would evaluate this level and bleeding when these results are back on plan of care.  Lab order placed.  Pt will call when she obtains the lab.  Patient verbalized understanding  All questions answered  Patient encouraged to call office or MD with any questions or concerns                  Pt states understanding.   1.74

## 2020-03-29 NOTE — ED PROVIDER NOTE - ATTENDING CONTRIBUTION TO CARE
Agree with above, Jason Mederos MD, FACEP  This patient was seen during the time of the COVID-19 pandemic, the care of this patient was in support of the [facility] countermeasures to COVID-19.

## 2020-03-29 NOTE — ED PROVIDER NOTE - NS ED ROS FT
CONST: +fevers, +chills  EYES: no pain, no vision changes  ENT: no sore throat, no ear pain, no change in hearing  CV: no chest pain, no leg swelling  RESP: +shortness of breath, +cough  ABD: no abdominal pain, no nausea, no vomiting, no diarrhea  : no dysuria, no flank pain, no hematuria  MSK: no back pain, no extremity pain  NEURO: no headache or additional neurologic complaints  HEME: no easy bleeding  SKIN:  no rash

## 2020-03-29 NOTE — ED ADULT NURSE NOTE - OBJECTIVE STATEMENT
59 y F M arrived to the Ed c/o sob. pt states " I haven't been feeling well for a couple day and been having sob and a dry cough." pt denies sick contacts, travel, HA, N/V/D, abdominal pain, urinary symptoms, hematuria, weakness, dizziness, numbness, tinging. Peripheral pulses present b/l. Skin warm, dry and pink. diminished lung sounds noted upon auscultation.

## 2020-03-30 DIAGNOSIS — J96.01 ACUTE RESPIRATORY FAILURE WITH HYPOXIA: ICD-10-CM

## 2020-03-30 DIAGNOSIS — I10 ESSENTIAL (PRIMARY) HYPERTENSION: ICD-10-CM

## 2020-03-30 DIAGNOSIS — J12.89 OTHER VIRAL PNEUMONIA: ICD-10-CM

## 2020-03-30 DIAGNOSIS — I25.10 ATHEROSCLEROTIC HEART DISEASE OF NATIVE CORONARY ARTERY WITHOUT ANGINA PECTORIS: ICD-10-CM

## 2020-03-30 DIAGNOSIS — Z98.890 OTHER SPECIFIED POSTPROCEDURAL STATES: Chronic | ICD-10-CM

## 2020-03-30 DIAGNOSIS — Z29.9 ENCOUNTER FOR PROPHYLACTIC MEASURES, UNSPECIFIED: ICD-10-CM

## 2020-03-30 DIAGNOSIS — R73.03 PREDIABETES: ICD-10-CM

## 2020-03-30 DIAGNOSIS — A41.9 SEPSIS, UNSPECIFIED ORGANISM: ICD-10-CM

## 2020-03-30 DIAGNOSIS — Z02.9 ENCOUNTER FOR ADMINISTRATIVE EXAMINATIONS, UNSPECIFIED: ICD-10-CM

## 2020-03-30 LAB
APTT BLD: 27.8 SEC — SIGNIFICANT CHANGE UP (ref 27.5–36.3)
CULTURE RESULTS: SIGNIFICANT CHANGE UP
GLUCOSE BLDC GLUCOMTR-MCNC: 138 MG/DL — HIGH (ref 70–99)
GLUCOSE BLDC GLUCOMTR-MCNC: 148 MG/DL — HIGH (ref 70–99)
GLUCOSE BLDC GLUCOMTR-MCNC: 164 MG/DL — HIGH (ref 70–99)
GLUCOSE BLDC GLUCOMTR-MCNC: 167 MG/DL — HIGH (ref 70–99)
INR BLD: 1.1 RATIO — SIGNIFICANT CHANGE UP (ref 0.88–1.16)
LACTATE SERPL-SCNC: 2.7 MMOL/L — HIGH (ref 0.7–2)
NT-PROBNP SERPL-SCNC: 147 PG/ML — SIGNIFICANT CHANGE UP (ref 0–300)
PROTHROM AB SERPL-ACNC: 12.6 SEC — SIGNIFICANT CHANGE UP (ref 10–13.1)
SPECIMEN SOURCE: SIGNIFICANT CHANGE UP
TROPONIN T, HIGH SENSITIVITY RESULT: 14 NG/L — SIGNIFICANT CHANGE UP (ref 0–51)

## 2020-03-30 PROCEDURE — 99233 SBSQ HOSP IP/OBS HIGH 50: CPT

## 2020-03-30 RX ORDER — ASPIRIN/CALCIUM CARB/MAGNESIUM 324 MG
81 TABLET ORAL DAILY
Refills: 0 | Status: DISCONTINUED | OUTPATIENT
Start: 2020-03-30 | End: 2020-04-08

## 2020-03-30 RX ORDER — SODIUM CHLORIDE 9 MG/ML
1000 INJECTION, SOLUTION INTRAVENOUS
Refills: 0 | Status: DISCONTINUED | OUTPATIENT
Start: 2020-03-30 | End: 2020-04-08

## 2020-03-30 RX ORDER — HYDROXYCHLOROQUINE SULFATE 200 MG
200 TABLET ORAL EVERY 12 HOURS
Refills: 0 | Status: COMPLETED | OUTPATIENT
Start: 2020-03-31 | End: 2020-04-03

## 2020-03-30 RX ORDER — DEXTROSE 50 % IN WATER 50 %
12.5 SYRINGE (ML) INTRAVENOUS ONCE
Refills: 0 | Status: DISCONTINUED | OUTPATIENT
Start: 2020-03-30 | End: 2020-04-08

## 2020-03-30 RX ORDER — ASCORBIC ACID 60 MG
1000 TABLET,CHEWABLE ORAL DAILY
Refills: 0 | Status: COMPLETED | OUTPATIENT
Start: 2020-03-30 | End: 2020-04-03

## 2020-03-30 RX ORDER — HYDROXYCHLOROQUINE SULFATE 200 MG
400 TABLET ORAL EVERY 12 HOURS
Refills: 0 | Status: COMPLETED | OUTPATIENT
Start: 2020-03-30 | End: 2020-03-30

## 2020-03-30 RX ORDER — HYDROXYCHLOROQUINE SULFATE 200 MG
TABLET ORAL
Refills: 0 | Status: COMPLETED | OUTPATIENT
Start: 2020-03-30 | End: 2020-04-03

## 2020-03-30 RX ORDER — ALBUTEROL 90 UG/1
2 AEROSOL, METERED ORAL EVERY 4 HOURS
Refills: 0 | Status: DISCONTINUED | OUTPATIENT
Start: 2020-03-30 | End: 2020-04-08

## 2020-03-30 RX ORDER — INSULIN LISPRO 100/ML
VIAL (ML) SUBCUTANEOUS AT BEDTIME
Refills: 0 | Status: DISCONTINUED | OUTPATIENT
Start: 2020-03-30 | End: 2020-04-08

## 2020-03-30 RX ORDER — ZINC SULFATE TAB 220 MG (50 MG ZINC EQUIVALENT) 220 (50 ZN) MG
220 TAB ORAL DAILY
Refills: 0 | Status: COMPLETED | OUTPATIENT
Start: 2020-03-30 | End: 2020-04-03

## 2020-03-30 RX ORDER — INSULIN LISPRO 100/ML
VIAL (ML) SUBCUTANEOUS
Refills: 0 | Status: DISCONTINUED | OUTPATIENT
Start: 2020-03-30 | End: 2020-04-08

## 2020-03-30 RX ORDER — DEXTROSE 50 % IN WATER 50 %
25 SYRINGE (ML) INTRAVENOUS ONCE
Refills: 0 | Status: DISCONTINUED | OUTPATIENT
Start: 2020-03-30 | End: 2020-04-08

## 2020-03-30 RX ORDER — GLUCAGON INJECTION, SOLUTION 0.5 MG/.1ML
1 INJECTION, SOLUTION SUBCUTANEOUS ONCE
Refills: 0 | Status: DISCONTINUED | OUTPATIENT
Start: 2020-03-30 | End: 2020-04-08

## 2020-03-30 RX ORDER — ACETAMINOPHEN 500 MG
650 TABLET ORAL EVERY 4 HOURS
Refills: 0 | Status: DISCONTINUED | OUTPATIENT
Start: 2020-03-30 | End: 2020-04-08

## 2020-03-30 RX ORDER — DEXTROSE 50 % IN WATER 50 %
15 SYRINGE (ML) INTRAVENOUS ONCE
Refills: 0 | Status: DISCONTINUED | OUTPATIENT
Start: 2020-03-30 | End: 2020-04-08

## 2020-03-30 RX ORDER — ENOXAPARIN SODIUM 100 MG/ML
40 INJECTION SUBCUTANEOUS DAILY
Refills: 0 | Status: DISCONTINUED | OUTPATIENT
Start: 2020-03-30 | End: 2020-04-08

## 2020-03-30 RX ORDER — ATORVASTATIN CALCIUM 80 MG/1
20 TABLET, FILM COATED ORAL AT BEDTIME
Refills: 0 | Status: DISCONTINUED | OUTPATIENT
Start: 2020-03-30 | End: 2020-04-01

## 2020-03-30 RX ORDER — ALBUTEROL 90 UG/1
2 AEROSOL, METERED ORAL ONCE
Refills: 0 | Status: COMPLETED | OUTPATIENT
Start: 2020-03-30 | End: 2020-03-30

## 2020-03-30 RX ADMIN — Medication 650 MILLIGRAM(S): at 05:57

## 2020-03-30 RX ADMIN — Medication 650 MILLIGRAM(S): at 20:52

## 2020-03-30 RX ADMIN — Medication 81 MILLIGRAM(S): at 11:37

## 2020-03-30 RX ADMIN — ALBUTEROL 2 PUFF(S): 90 AEROSOL, METERED ORAL at 02:00

## 2020-03-30 RX ADMIN — SODIUM CHLORIDE 500 MILLILITER(S): 9 INJECTION, SOLUTION INTRAVENOUS at 00:51

## 2020-03-30 RX ADMIN — Medication 1000 MILLIGRAM(S): at 11:39

## 2020-03-30 RX ADMIN — Medication 1: at 12:21

## 2020-03-30 RX ADMIN — Medication 400 MILLIGRAM(S): at 17:23

## 2020-03-30 RX ADMIN — ENOXAPARIN SODIUM 40 MILLIGRAM(S): 100 INJECTION SUBCUTANEOUS at 11:37

## 2020-03-30 RX ADMIN — ZINC SULFATE TAB 220 MG (50 MG ZINC EQUIVALENT) 220 MILLIGRAM(S): 220 (50 ZN) TAB at 11:38

## 2020-03-30 RX ADMIN — Medication 100 MILLIGRAM(S): at 02:00

## 2020-03-30 RX ADMIN — Medication 400 MILLIGRAM(S): at 03:22

## 2020-03-30 RX ADMIN — ATORVASTATIN CALCIUM 20 MILLIGRAM(S): 80 TABLET, FILM COATED ORAL at 21:07

## 2020-03-30 NOTE — H&P ADULT - NSHPPHYSICALEXAM_GEN_ALL_CORE
Defer physical exam in effort to preserve PPE and limit exposure. Please refer to ED exam documented few hours prior.     Pt dyspneic and coughing with prolonged speaking. Audible wheezing heard.     Vital Signs Last 24 Hrs  T(C): 37.1 (03-30-20 @ 00:25)  T(F): 98.8 (03-30-20 @ 00:25), Max: 99.8 (03-29-20 @ 17:24)  HR: 58 (03-30-20 @ 00:25) (58 - 135)  BP: 149/92 (03-30-20 @ 00:25)  BP(mean): --  RR: 20 (03-30-20 @ 00:25) (19 - 30)  SpO2: 99% (03-30-20 @ 00:25) (91% - 99%)  Wt(kg): --    03-29 @ 07:01  -  03-30 @ 02:37  --------------------------------------------------------  IN: 0 mL / OUT: 200 mL / NET: -200 mL    CAPILLARY BLOOD GLUCOSE  POCT Blood Glucose.: 142 mg/dL (29 Mar 2020 21:40)

## 2020-03-30 NOTE — PROGRESS NOTE ADULT - PROBLEM SELECTOR PLAN 3
--with associated bandemia to 7%  --Received CTX and azithromycin x 1 in ED as CXR infiltrate unilateral; however, since COVID positive less likely bacterial PNA.  --hold further ABx for now. Consider ID input in the AM.   --avoid further IVF resuscitation unless hypotensive as has been shown to worsen outcomes --with associated bandemia to 7%, Received CTX and azithromycin x 1 in ED as CXR infiltrate unilateral; however, since COVID positive less likely bacterial PNA.  - possible ID c/s?  - avoid IVF to prevent ARDS

## 2020-03-30 NOTE — H&P ADULT - ASSESSMENT
59M PMH CAD s/p SUBHA x 2 (2017), borderline T2DM, HTN, HLD p/w cough, SOB, and fever, admitted with sepsis and hypoxic resp failure due to COVID19 infection.

## 2020-03-30 NOTE — H&P ADULT - PROBLEM SELECTOR PLAN 3
--with associated bandemia to 7%  --Received CTX and azithromycin x 1 in ED as CXR infiltrate unilateral; however, since COVID positive less likely bacterial PNA.  --hold further ABx for now. Consider ID input in the AM.   --avoid further IVF resuscitation unless hypotensive as has been shown to worsen outcomes

## 2020-03-30 NOTE — H&P ADULT - NSHPREVIEWOFSYSTEMS_GEN_ALL_CORE
Review of Systems:   CONSTITUTIONAL: +fever, fatigue  EYES: No eye pain, visual disturbances, or discharge  ENMT:  No difficulty hearing, tinnitus, vertigo; No sinus or throat pain  NECK: No pain or stiffness  RESPIRATORY: +cough, chills, wheezing, SOB  CARDIOVASCULAR: No chest pain, palpitations, dizziness, or leg swelling  GASTROINTESTINAL: No abdominal or epigastric pain. No nausea, vomiting, or hematemesis; No diarrhea or constipation. No melena or hematochezia.  GENITOURINARY: No dysuria, frequency, hematuria, or incontinence  NEUROLOGICAL: No headaches, memory loss, loss of strength, numbness, or tremors  ENDOCRINE: No heat or cold intolerance; No hair loss  MUSCULOSKELETAL: No joint pain or swelling; No muscle, back, or extremity pain  PSYCHIATRIC: No depression, anxiety, mood swings, or difficulty sleeping  HEME/LYMPH: No easy bruising, or bleeding gums  ALLERGY AND IMMUNOLOGIC: No hives or eczema

## 2020-03-30 NOTE — PROGRESS NOTE ADULT - PROBLEM SELECTOR PLAN 8
Problem: Discharge planning issues. Plan; Transitions of Care Status:  1. Name of PCP:  2. PCP contacted on Admission: []Y []N  3. PCP contacted at Discharge: []Y []N  4. Post-Discharge Appointment Date and Location:   5. Summary of Handoff given to PCP:

## 2020-03-30 NOTE — PROGRESS NOTE ADULT - SUBJECTIVE AND OBJECTIVE BOX
Patient is a 59y old  Male who presents with a chief complaint of cough, SOB (30 Mar 2020 02:33)      SUBJECTIVE / OVERNIGHT EVENTS:    MEDICATIONS  (STANDING):  aspirin enteric coated 81 milliGRAM(s) Oral daily  atorvastatin 20 milliGRAM(s) Oral at bedtime  enoxaparin Injectable 40 milliGRAM(s) SubCutaneous daily  hydroxychloroquine   Oral   hydroxychloroquine 400 milliGRAM(s) Oral every 12 hours    MEDICATIONS  (PRN):  acetaminophen   Tablet .. 650 milliGRAM(s) Oral every 4 hours PRN Temp greater or equal to 38.5C (101.3F)  ALBUTerol    90 MICROgram(s) HFA Inhaler 2 Puff(s) Inhalation every 4 hours PRN Shortness of Breath and/or Wheezing  guaiFENesin   Syrup  (Sugar-Free) 100 milliGRAM(s) Oral every 6 hours PRN Cough      Vital Signs Last 24 Hrs  T(C): 38.5 (30 Mar 2020 05:52), Max: 38.5 (30 Mar 2020 05:52)  T(F): 101.3 (30 Mar 2020 05:52), Max: 101.3 (30 Mar 2020 05:52)  HR: 107 (30 Mar 2020 05:52) (91 - 135)  BP: 119/78 (30 Mar 2020 05:52) (119/78 - 149/92)  BP(mean): --  RR: 20 (30 Mar 2020 05:52) (19 - 30)  SpO2: 96% (30 Mar 2020 05:52) (91% - 99%)  CAPILLARY BLOOD GLUCOSE      POCT Blood Glucose.: 142 mg/dL (29 Mar 2020 21:40)    I&O's Summary    29 Mar 2020 07:01  -  30 Mar 2020 07:00  --------------------------------------------------------  IN: 500 mL / OUT: 200 mL / NET: 300 mL    PHYSICAL EXAM:  GENERAL: NAD, well-developed  HEAD:  Atraumatic, Normocephalic  EYES: EOMI, PERRLA, conjunctiva and sclera clear  NECK: Supple, No JVD  CHEST/LUNG: Clear to auscultation bilaterally; No wheeze  HEART: Regular rate and rhythm; No murmurs, rubs, or gallops  ABDOMEN: Soft, Nontender, Nondistended; Bowel sounds present  EXTREMITIES:  2+ Peripheral Pulses, No clubbing, cyanosis, or edema  PSYCH: AAOx3  NEUROLOGY: non-focal  SKIN: No rashes or lesions    LABS:                        15.6   6.65  )-----------( 209      ( 29 Mar 2020 17:03 )             47.0         135  |  96  |  12  ----------------------------<  317<H>  3.7   |  21<L>  |  0.98    Ca    9.0      29 Mar 2020 17:03  Mg     2.0         TPro  7.8  /  Alb  4.2  /  TBili  0.5  /  DBili  x   /  AST  20  /  ALT  32  /  AlkPhos  102  -    PT/INR - ( 29 Mar 2020 17:03 )   PT: 12.5 sec;   INR: 1.09 ratio         PTT - ( 29 Mar 2020 17:03 )  PTT:29.8 sec      Urinalysis Basic - ( 29 Mar 2020 17:03 )    Color: Colorless / Appearance: Clear / S.009 / pH: x  Gluc: x / Ketone: Negative  / Bili: Negative / Urobili: Negative   Blood: x / Protein: 30 mg/dL / Nitrite: Negative   Leuk Esterase: Negative / RBC: 0 /hpf / WBC 0 /HPF   Sq Epi: x / Non Sq Epi: 0 / Bacteria: Negative      RADIOLOGY & ADDITIONAL TESTS:    Imaging Personally Reviewed:    Consultant(s) Notes Reviewed:      Care Discussed with Consultants/Other Providers: Patient is a 59y old  Male who presents with a chief complaint of cough, SOB (30 Mar 2020 02:33)      SUBJECTIVE / OVERNIGHT EVENTS:    Pt reports feeling febrile last night, had a temp to 101.8, pt also had difficulty breathing, unable to sleep properly. Otherwise denies chills, chest p/p, sob, n/v/d.     MEDICATIONS  (STANDING):  aspirin enteric coated 81 milliGRAM(s) Oral daily  atorvastatin 20 milliGRAM(s) Oral at bedtime  enoxaparin Injectable 40 milliGRAM(s) SubCutaneous daily  hydroxychloroquine   Oral   hydroxychloroquine 400 milliGRAM(s) Oral every 12 hours    MEDICATIONS  (PRN):  acetaminophen   Tablet .. 650 milliGRAM(s) Oral every 4 hours PRN Temp greater or equal to 38.5C (101.3F)  ALBUTerol    90 MICROgram(s) HFA Inhaler 2 Puff(s) Inhalation every 4 hours PRN Shortness of Breath and/or Wheezing  guaiFENesin   Syrup  (Sugar-Free) 100 milliGRAM(s) Oral every 6 hours PRN Cough      Vital Signs Last 24 Hrs  T(C): 38.5 (30 Mar 2020 05:52), Max: 38.5 (30 Mar 2020 05:52)  T(F): 101.3 (30 Mar 2020 05:52), Max: 101.3 (30 Mar 2020 05:52)  HR: 107 (30 Mar 2020 05:52) (91 - 135)  BP: 119/78 (30 Mar 2020 05:52) (119/78 - 149/92)  BP(mean): --  RR: 20 (30 Mar 2020 05:52) (19 - 30)  SpO2: 96% (30 Mar 2020 05:52) (91% - 99%)  CAPILLARY BLOOD GLUCOSE      POCT Blood Glucose.: 142 mg/dL (29 Mar 2020 21:40)    I&O's Summary    29 Mar 2020 07:01  -  30 Mar 2020 07:00  --------------------------------------------------------  IN: 500 mL / OUT: 200 mL / NET: 300 mL    PHYSICAL EXAM:  deferred to minimize exposure and PPE use    LABS:                        15.6   6.65  )-----------( 209      ( 29 Mar 2020 17:03 )             47.0         135  |  96  |  12  ----------------------------<  317<H>  3.7   |  21<L>  |  0.98    Ca    9.0      29 Mar 2020 17:03  Mg     2.0         TPro  7.8  /  Alb  4.2  /  TBili  0.5  /  DBili  x   /  AST  20  /  ALT  32  /  AlkPhos  102  -    PT/INR - ( 29 Mar 2020 17:03 )   PT: 12.5 sec;   INR: 1.09 ratio         PTT - ( 29 Mar 2020 17:03 )  PTT:29.8 sec      Urinalysis Basic - ( 29 Mar 2020 17:03 )    Color: Colorless / Appearance: Clear / S.009 / pH: x  Gluc: x / Ketone: Negative  / Bili: Negative / Urobili: Negative   Blood: x / Protein: 30 mg/dL / Nitrite: Negative   Leuk Esterase: Negative / RBC: 0 /hpf / WBC 0 /HPF   Sq Epi: x / Non Sq Epi: 0 / Bacteria: Negative      RADIOLOGY & ADDITIONAL TESTS:    Imaging Personally Reviewed:    Consultant(s) Notes Reviewed:      Care Discussed with Consultants/Other Providers:

## 2020-03-30 NOTE — H&P ADULT - PROBLEM SELECTOR PLAN 1
--secondary to COVID infection  --requiring 3L NC oxygen  --wheezing - albuterol MDI PRN. Check proBNP but not overloaded appearing on ED exam and on labs/CXR.

## 2020-03-30 NOTE — H&P ADULT - HISTORY OF PRESENT ILLNESS
59M PMH CAD s/p SUBHA x 2 (2017), borderline T2DM, HTN, HLD p/w cough, SOB, and fever. Symptoms started 3-4 days ago. Fevers and chills at home. SOB started about 2 days ago and has been progressively worsening, prompting him to come to the hospital today. Minimal sputum but thinks he is wheezing. +myalgias. No known COVID+ contacts, no recent travel. Wife at home is healthy. Denies chest pain, syncope, palpitations, NVD, LE edema.

## 2020-03-30 NOTE — H&P ADULT - NSICDXPASTMEDICALHX_GEN_ALL_CORE_FT
PAST MEDICAL HISTORY:  CAD (coronary artery disease) SUBHA x 2 2017    HTN (hypertension)     Prediabetes

## 2020-03-30 NOTE — H&P ADULT - NSHPLABSRESULTS_GEN_ALL_CORE
EKG, labs, and imaging personally reviewed and interpreted.     LABS:                        15.6   6.65  )-----------( 209      ( 29 Mar 2020 17:03 )             47.0     135  |  96  |  12  ----------------------------<  317<H>  3.7   |  21<L>  |  0.98    Ca    9.0      29 Mar 2020 17:03  Mg     2.0     -    TPro  7.8  /  Alb  4.2  /  TBili  0.5  /  DBili  x   /  AST  20  /  ALT  32  /  AlkPhos  102  03-29    PT/INR - ( 29 Mar 2020 17:03 )   PT: 12.5 sec;   INR: 1.09 ratio    PTT - ( 29 Mar 2020 17:03 )  PTT:29.8 sec    Urinalysis Basic - ( 29 Mar 2020 17:03 )    Color: Colorless / Appearance: Clear / S.009 / pH: x  Gluc: x / Ketone: Negative  / Bili: Negative / Urobili: Negative   Blood: x / Protein: 30 mg/dL / Nitrite: Negative   Leuk Esterase: Negative / RBC: 0 /hpf / WBC 0 /HPF   Sq Epi: x / Non Sq Epi: 0 / Bacteria: Negative    RADIOLOGY & ADDITIONAL TESTS:  Imaging Personally Reviewed:    CXR:  EXAM:  XR CHEST PORTABLE URGENT 1V                        PROCEDURE DATE:  2020    INTERPRETATION:  CLINICAL INDICATION: Assess for Covid  TECHNIQUE: Single view frontal radiograph the chest  COMPARISON: No similar prior comparisons available.  FINDINGS:  No acute osseous abnormity. The cardiac silhouette is normal in size. Cardiac stents are in place. Low lung volumes bilaterally. Hazy left lower lung retrocardiac opacity. There is no pneumothorax.    IMPRESSION:  Left lower lung field hazy opacity, may represent pneumonia versus atelectasis.    Consultant(s) Notes Reviewed:  Yes  Care Discussed with Consultants/Other Providers: Yes  Outpatient and prior hospitalization records reviewed: Yes

## 2020-03-30 NOTE — PROGRESS NOTE ADULT - PROBLEM SELECTOR PLAN 1
--secondary to COVID infection  --requiring 3L NC oxygen  --wheezing - albuterol MDI PRN. Check proBNP but not overloaded appearing on ED exam and on labs/CXR. 2/2 to COVID infection  -requiring 3L NC oxygen, titrate up as per COVID protocol, salumedrol 1mg/kg if on nonrebreather  - albuterol MDI PRN. proBNP not elevated.  - hydroxychloroquine load and maintenance 2/2 to COVID infection  -requiring 3L NC oxygen, titrate up as per COVID protocol, solumedrol 1mg/kg if on nonrebreather  - albuterol MDI PRN. pro BNP not elevated.  - hydroxychloroquine load and maintenance

## 2020-03-30 NOTE — H&P ADULT - PROBLEM SELECTOR PLAN 4
--stents 3 years ago, c/w aspirin and statin  --no chest pain. Non ischemic EKG. Will check trop as part of COVID order set.

## 2020-03-31 ENCOUNTER — TRANSCRIPTION ENCOUNTER (OUTPATIENT)
Age: 60
End: 2020-03-31

## 2020-03-31 LAB
ALBUMIN SERPL ELPH-MCNC: 3.4 G/DL — SIGNIFICANT CHANGE UP (ref 3.3–5)
ALP SERPL-CCNC: 78 U/L — SIGNIFICANT CHANGE UP (ref 40–120)
ALT FLD-CCNC: 36 U/L — SIGNIFICANT CHANGE UP (ref 10–45)
ANION GAP SERPL CALC-SCNC: 14 MMOL/L — SIGNIFICANT CHANGE UP (ref 5–17)
AST SERPL-CCNC: 30 U/L — SIGNIFICANT CHANGE UP (ref 10–40)
BASOPHILS # BLD AUTO: 0.04 K/UL — SIGNIFICANT CHANGE UP (ref 0–0.2)
BASOPHILS NFR BLD AUTO: 0.7 % — SIGNIFICANT CHANGE UP (ref 0–2)
BILIRUB SERPL-MCNC: 0.6 MG/DL — SIGNIFICANT CHANGE UP (ref 0.2–1.2)
BUN SERPL-MCNC: 11 MG/DL — SIGNIFICANT CHANGE UP (ref 7–23)
CALCIUM SERPL-MCNC: 8.7 MG/DL — SIGNIFICANT CHANGE UP (ref 8.4–10.5)
CHLORIDE SERPL-SCNC: 97 MMOL/L — SIGNIFICANT CHANGE UP (ref 96–108)
CK MB BLD-MCNC: <0.8 % — SIGNIFICANT CHANGE UP (ref 0–3.5)
CK MB CFR SERPL CALC: <1 NG/ML — SIGNIFICANT CHANGE UP (ref 0–6.7)
CK SERPL-CCNC: 126 U/L — SIGNIFICANT CHANGE UP (ref 30–200)
CO2 SERPL-SCNC: 26 MMOL/L — SIGNIFICANT CHANGE UP (ref 22–31)
CREAT SERPL-MCNC: 1.05 MG/DL — SIGNIFICANT CHANGE UP (ref 0.5–1.3)
CRP SERPL-MCNC: 2.13 MG/DL — HIGH (ref 0–0.4)
EOSINOPHIL # BLD AUTO: 0.04 K/UL — SIGNIFICANT CHANGE UP (ref 0–0.5)
EOSINOPHIL NFR BLD AUTO: 0.7 % — SIGNIFICANT CHANGE UP (ref 0–6)
GLUCOSE BLDC GLUCOMTR-MCNC: 136 MG/DL — HIGH (ref 70–99)
GLUCOSE SERPL-MCNC: 156 MG/DL — HIGH (ref 70–99)
HBA1C BLD-MCNC: 7.6 % — HIGH (ref 4–5.6)
HCT VFR BLD CALC: 48 % — SIGNIFICANT CHANGE UP (ref 39–50)
HGB BLD-MCNC: 15.6 G/DL — SIGNIFICANT CHANGE UP (ref 13–17)
IMM GRANULOCYTES NFR BLD AUTO: 3.3 % — HIGH (ref 0–1.5)
LACTATE SERPL-SCNC: 1.6 MMOL/L — SIGNIFICANT CHANGE UP (ref 0.7–2)
LYMPHOCYTES # BLD AUTO: 1.09 K/UL — SIGNIFICANT CHANGE UP (ref 1–3.3)
LYMPHOCYTES # BLD AUTO: 18.8 % — SIGNIFICANT CHANGE UP (ref 13–44)
MCHC RBC-ENTMCNC: 28.3 PG — SIGNIFICANT CHANGE UP (ref 27–34)
MCHC RBC-ENTMCNC: 32.5 GM/DL — SIGNIFICANT CHANGE UP (ref 32–36)
MCV RBC AUTO: 87 FL — SIGNIFICANT CHANGE UP (ref 80–100)
MONOCYTES # BLD AUTO: 0.47 K/UL — SIGNIFICANT CHANGE UP (ref 0–0.9)
MONOCYTES NFR BLD AUTO: 8.1 % — SIGNIFICANT CHANGE UP (ref 2–14)
NEUTROPHILS # BLD AUTO: 3.98 K/UL — SIGNIFICANT CHANGE UP (ref 1.8–7.4)
NEUTROPHILS NFR BLD AUTO: 68.4 % — SIGNIFICANT CHANGE UP (ref 43–77)
NRBC # BLD: 0 /100 WBCS — SIGNIFICANT CHANGE UP (ref 0–0)
PLATELET # BLD AUTO: 180 K/UL — SIGNIFICANT CHANGE UP (ref 150–400)
POTASSIUM SERPL-MCNC: 3.7 MMOL/L — SIGNIFICANT CHANGE UP (ref 3.5–5.3)
POTASSIUM SERPL-SCNC: 3.7 MMOL/L — SIGNIFICANT CHANGE UP (ref 3.5–5.3)
PROT SERPL-MCNC: 7.2 G/DL — SIGNIFICANT CHANGE UP (ref 6–8.3)
RBC # BLD: 5.52 M/UL — SIGNIFICANT CHANGE UP (ref 4.2–5.8)
RBC # FLD: 11.9 % — SIGNIFICANT CHANGE UP (ref 10.3–14.5)
SODIUM SERPL-SCNC: 137 MMOL/L — SIGNIFICANT CHANGE UP (ref 135–145)
TROPONIN T, HIGH SENSITIVITY RESULT: 10 NG/L — SIGNIFICANT CHANGE UP (ref 0–51)
TROPONIN T, HIGH SENSITIVITY RESULT: 11 NG/L — SIGNIFICANT CHANGE UP (ref 0–51)
WBC # BLD: 5.81 K/UL — SIGNIFICANT CHANGE UP (ref 3.8–10.5)
WBC # FLD AUTO: 5.81 K/UL — SIGNIFICANT CHANGE UP (ref 3.8–10.5)

## 2020-03-31 PROCEDURE — 99233 SBSQ HOSP IP/OBS HIGH 50: CPT | Mod: GC

## 2020-03-31 PROCEDURE — 71045 X-RAY EXAM CHEST 1 VIEW: CPT | Mod: 26

## 2020-03-31 PROCEDURE — 93010 ELECTROCARDIOGRAM REPORT: CPT

## 2020-03-31 RX ORDER — ALBUTEROL 90 UG/1
2 AEROSOL, METERED ORAL ONCE
Refills: 0 | Status: COMPLETED | OUTPATIENT
Start: 2020-03-31 | End: 2020-03-31

## 2020-03-31 RX ORDER — ALBUTEROL 90 UG/1
2 AEROSOL, METERED ORAL
Qty: 1 | Refills: 0
Start: 2020-03-31 | End: 2020-04-29

## 2020-03-31 RX ORDER — ZINC SULFATE TAB 220 MG (50 MG ZINC EQUIVALENT) 220 (50 ZN) MG
1 TAB ORAL
Qty: 3 | Refills: 0
Start: 2020-03-31 | End: 2020-04-02

## 2020-03-31 RX ORDER — ACETAMINOPHEN 500 MG
650 TABLET ORAL ONCE
Refills: 0 | Status: COMPLETED | OUTPATIENT
Start: 2020-03-31 | End: 2020-03-31

## 2020-03-31 RX ORDER — ACETAMINOPHEN 500 MG
1000 TABLET ORAL ONCE
Refills: 0 | Status: COMPLETED | OUTPATIENT
Start: 2020-03-31 | End: 2020-03-31

## 2020-03-31 RX ORDER — HYDROXYCHLOROQUINE SULFATE 200 MG
1 TABLET ORAL
Qty: 6 | Refills: 0
Start: 2020-03-31 | End: 2020-04-02

## 2020-03-31 RX ORDER — ASCORBIC ACID 60 MG
1 TABLET,CHEWABLE ORAL
Qty: 3 | Refills: 0
Start: 2020-03-31 | End: 2020-04-02

## 2020-03-31 RX ORDER — ASPIRIN/CALCIUM CARB/MAGNESIUM 324 MG
1 TABLET ORAL
Qty: 0 | Refills: 0 | DISCHARGE

## 2020-03-31 RX ORDER — ASPIRIN/CALCIUM CARB/MAGNESIUM 324 MG
1 TABLET ORAL
Qty: 0 | Refills: 0 | DISCHARGE
Start: 2020-03-31

## 2020-03-31 RX ADMIN — Medication 200 MILLIGRAM(S): at 04:08

## 2020-03-31 RX ADMIN — Medication 400 MILLIGRAM(S): at 13:14

## 2020-03-31 RX ADMIN — Medication 200 MILLIGRAM(S): at 17:52

## 2020-03-31 RX ADMIN — ALBUTEROL 2 PUFF(S): 90 AEROSOL, METERED ORAL at 13:14

## 2020-03-31 RX ADMIN — ATORVASTATIN CALCIUM 20 MILLIGRAM(S): 80 TABLET, FILM COATED ORAL at 21:24

## 2020-03-31 RX ADMIN — Medication 100 MILLIGRAM(S): at 21:24

## 2020-03-31 RX ADMIN — ENOXAPARIN SODIUM 40 MILLIGRAM(S): 100 INJECTION SUBCUTANEOUS at 11:37

## 2020-03-31 RX ADMIN — Medication 100 MILLIGRAM(S): at 13:14

## 2020-03-31 RX ADMIN — Medication 1000 MILLIGRAM(S): at 11:37

## 2020-03-31 RX ADMIN — ZINC SULFATE TAB 220 MG (50 MG ZINC EQUIVALENT) 220 MILLIGRAM(S): 220 (50 ZN) TAB at 11:37

## 2020-03-31 RX ADMIN — Medication 650 MILLIGRAM(S): at 21:53

## 2020-03-31 RX ADMIN — Medication 81 MILLIGRAM(S): at 11:37

## 2020-03-31 NOTE — DISCHARGE NOTE PROVIDER - PROVIDER TOKENS
FREE:[LAST:[PLEASE FOLLOW-UP WITH YOUR ESTABLISHED PCP AFTER 14-DAY QUARANTINE],PHONE:[(   )    -],FAX:[(   )    -],FOLLOWUP:[2 weeks]]

## 2020-03-31 NOTE — PROGRESS NOTE ADULT - SUBJECTIVE AND OBJECTIVE BOX
Patient is a 59y old  Male who presents with a chief complaint of cough, SOB (30 Mar 2020 07:11)      SUBJECTIVE / OVERNIGHT EVENTS:    MEDICATIONS  (STANDING):  ascorbic acid 1000 milliGRAM(s) Oral daily  aspirin enteric coated 81 milliGRAM(s) Oral daily  atorvastatin 20 milliGRAM(s) Oral at bedtime  dextrose 5%. 1000 milliLiter(s) (50 mL/Hr) IV Continuous <Continuous>  dextrose 50% Injectable 12.5 Gram(s) IV Push once  dextrose 50% Injectable 25 Gram(s) IV Push once  dextrose 50% Injectable 25 Gram(s) IV Push once  enoxaparin Injectable 40 milliGRAM(s) SubCutaneous daily  hydroxychloroquine   Oral   hydroxychloroquine 200 milliGRAM(s) Oral every 12 hours  insulin lispro (HumaLOG) corrective regimen sliding scale   SubCutaneous three times a day before meals  insulin lispro (HumaLOG) corrective regimen sliding scale   SubCutaneous at bedtime  zinc sulfate 220 milliGRAM(s) Oral daily    MEDICATIONS  (PRN):  acetaminophen   Tablet .. 650 milliGRAM(s) Oral every 4 hours PRN Temp greater or equal to 38.5C (101.3F)  ALBUTerol    90 MICROgram(s) HFA Inhaler 2 Puff(s) Inhalation every 4 hours PRN Shortness of Breath and/or Wheezing  dextrose 40% Gel 15 Gram(s) Oral once PRN Blood Glucose LESS THAN 70 milliGRAM(s)/deciliter  glucagon  Injectable 1 milliGRAM(s) IntraMuscular once PRN Glucose LESS THAN 70 milligrams/deciliter  guaiFENesin   Syrup  (Sugar-Free) 100 milliGRAM(s) Oral every 6 hours PRN Cough      Vital Signs Last 24 Hrs  T(C): 37.8 (31 Mar 2020 06:14), Max: 38.9 (30 Mar 2020 20:30)  T(F): 100 (31 Mar 2020 06:14), Max: 102 (30 Mar 2020 20:30)  HR: 94 (31 Mar 2020 06:14) (81 - 99)  BP: 118/88 (31 Mar 2020 06:14) (111/82 - 135/89)  BP(mean): --  RR: 20 (31 Mar 2020 06:14) (19 - 24)  SpO2: 96% (31 Mar 2020 06:14) (93% - 97%)  CAPILLARY BLOOD GLUCOSE      POCT Blood Glucose.: 164 mg/dL (30 Mar 2020 21:45)  POCT Blood Glucose.: 138 mg/dL (30 Mar 2020 17:18)  POCT Blood Glucose.: 167 mg/dL (30 Mar 2020 11:45)    I&O's Summary    29 Mar 2020 07:01  -  30 Mar 2020 07:00  --------------------------------------------------------  IN: 500 mL / OUT: 200 mL / NET: 300 mL    30 Mar 2020 07:01  -  31 Mar 2020 06:36  --------------------------------------------------------  IN: 1380 mL / OUT: 1550 mL / NET: -170 mL      PHYSICAL EXAM:  GENERAL: NAD, well-developed  HEAD:  Atraumatic, Normocephalic  EYES: EOMI, PERRLA, conjunctiva and sclera clear  NECK: Supple,  CHEST/LUNG: Mild expiratory wheezing at the left lower lobe  HEART: Regular rate and rhythm; No murmurs, rubs, or gallops  ABDOMEN: Soft, Nontender, Nondistended; Bowel sounds present  EXTREMITIES:  2+ Peripheral Pulses, No clubbing, cyanosis, or edema  PSYCH: AAOx3  NEUROLOGY: non-focal  SKIN: No rashes or lesions    LABS:                        15.6   6.65  )-----------( 209      ( 29 Mar 2020 17:03 )             47.0     03-29    135  |  96  |  12  ----------------------------<  317<H>  3.7   |  21<L>  |  0.98    Ca    9.0      29 Mar 2020 17:03  Mg     2.0         TPro  7.8  /  Alb  4.2  /  TBili  0.5  /  DBili  x   /  AST  20  /  ALT  32  /  AlkPhos  102  03-29    PT/INR - ( 30 Mar 2020 08:19 )   PT: 12.6 sec;   INR: 1.10 ratio         PTT - ( 30 Mar 2020 08:19 )  PTT:27.8 sec      Urinalysis Basic - ( 29 Mar 2020 17:03 )    Color: Colorless / Appearance: Clear / S.009 / pH: x  Gluc: x / Ketone: Negative  / Bili: Negative / Urobili: Negative   Blood: x / Protein: 30 mg/dL / Nitrite: Negative   Leuk Esterase: Negative / RBC: 0 /hpf / WBC 0 /HPF   Sq Epi: x / Non Sq Epi: 0 / Bacteria: Negative        RADIOLOGY & ADDITIONAL TESTS:    Imaging Personally Reviewed:    Consultant(s) Notes Reviewed:      Care Discussed with Consultants/Other Providers: Patient is a 59y old  Male who presents with a chief complaint of cough, SOB (30 Mar 2020 07:11)      SUBJECTIVE / OVERNIGHT EVENTS:    spoke to the pt at bedside. Pt currently having some coughs but otherwise, stable breathing. Reports some fevers last night as well. Denies chills, chest p/p, n/v/d.     MEDICATIONS  (STANDING):  ascorbic acid 1000 milliGRAM(s) Oral daily  aspirin enteric coated 81 milliGRAM(s) Oral daily  atorvastatin 20 milliGRAM(s) Oral at bedtime  dextrose 5%. 1000 milliLiter(s) (50 mL/Hr) IV Continuous <Continuous>  dextrose 50% Injectable 12.5 Gram(s) IV Push once  dextrose 50% Injectable 25 Gram(s) IV Push once  dextrose 50% Injectable 25 Gram(s) IV Push once  enoxaparin Injectable 40 milliGRAM(s) SubCutaneous daily  hydroxychloroquine   Oral   hydroxychloroquine 200 milliGRAM(s) Oral every 12 hours  insulin lispro (HumaLOG) corrective regimen sliding scale   SubCutaneous three times a day before meals  insulin lispro (HumaLOG) corrective regimen sliding scale   SubCutaneous at bedtime  zinc sulfate 220 milliGRAM(s) Oral daily    MEDICATIONS  (PRN):  acetaminophen   Tablet .. 650 milliGRAM(s) Oral every 4 hours PRN Temp greater or equal to 38.5C (101.3F)  ALBUTerol    90 MICROgram(s) HFA Inhaler 2 Puff(s) Inhalation every 4 hours PRN Shortness of Breath and/or Wheezing  dextrose 40% Gel 15 Gram(s) Oral once PRN Blood Glucose LESS THAN 70 milliGRAM(s)/deciliter  glucagon  Injectable 1 milliGRAM(s) IntraMuscular once PRN Glucose LESS THAN 70 milligrams/deciliter  guaiFENesin   Syrup  (Sugar-Free) 100 milliGRAM(s) Oral every 6 hours PRN Cough      Vital Signs Last 24 Hrs  T(C): 37.8 (31 Mar 2020 06:14), Max: 38.9 (30 Mar 2020 20:30)  T(F): 100 (31 Mar 2020 06:14), Max: 102 (30 Mar 2020 20:30)  HR: 94 (31 Mar 2020 06:14) (81 - 99)  BP: 118/88 (31 Mar 2020 06:14) (111/82 - 135/89)  BP(mean): --  RR: 20 (31 Mar 2020 06:14) (19 - 24)  SpO2: 96% (31 Mar 2020 06:14) (93% - 97%)  CAPILLARY BLOOD GLUCOSE      POCT Blood Glucose.: 164 mg/dL (30 Mar 2020 21:45)  POCT Blood Glucose.: 138 mg/dL (30 Mar 2020 17:18)  POCT Blood Glucose.: 167 mg/dL (30 Mar 2020 11:45)    I&O's Summary    29 Mar 2020 07:01  -  30 Mar 2020 07:00  --------------------------------------------------------  IN: 500 mL / OUT: 200 mL / NET: 300 mL    30 Mar 2020 07:01  -  31 Mar 2020 06:36  --------------------------------------------------------  IN: 1380 mL / OUT: 1550 mL / NET: -170 mL      PHYSICAL EXAM:  GENERAL: NAD, well-developed  HEAD:  Atraumatic, Normocephalic  EYES: EOMI, PERRLA, conjunctiva and sclera clear  NECK: Supple,  CHEST/LUNG: Mild expiratory wheezing at the left lower lobe  HEART: Regular rate and rhythm; No murmurs, rubs, or gallops  ABDOMEN: Soft, Nontender, Nondistended; Bowel sounds present  EXTREMITIES:  2+ Peripheral Pulses, No clubbing, cyanosis, or edema  PSYCH: AAOx3  NEUROLOGY: non-focal  SKIN: No rashes or lesions    LABS:                        15.6   6.65  )-----------( 209      ( 29 Mar 2020 17:03 )             47.0     03-29    135  |  96  |  12  ----------------------------<  317<H>  3.7   |  21<L>  |  0.98    Ca    9.0      29 Mar 2020 17:03  Mg     2.0     03-29    TPro  7.8  /  Alb  4.2  /  TBili  0.5  /  DBili  x   /  AST  20  /  ALT  32  /  AlkPhos  102  03-29    PT/INR - ( 30 Mar 2020 08:19 )   PT: 12.6 sec;   INR: 1.10 ratio         PTT - ( 30 Mar 2020 08:19 )  PTT:27.8 sec      Urinalysis Basic - ( 29 Mar 2020 17:03 )    Color: Colorless / Appearance: Clear / S.009 / pH: x  Gluc: x / Ketone: Negative  / Bili: Negative / Urobili: Negative   Blood: x / Protein: 30 mg/dL / Nitrite: Negative   Leuk Esterase: Negative / RBC: 0 /hpf / WBC 0 /HPF   Sq Epi: x / Non Sq Epi: 0 / Bacteria: Negative        RADIOLOGY & ADDITIONAL TESTS:    Imaging Personally Reviewed:    Consultant(s) Notes Reviewed:      Care Discussed with Consultants/Other Providers:

## 2020-03-31 NOTE — PROGRESS NOTE ADULT - PROBLEM SELECTOR PLAN 1
2/2 to COVID infection  -requiring 3L NC oxygen, titrate up as per COVID protocol, solumedrol 1mg/kg if on nonrebreather  - albuterol MDI PRN. pro BNP not elevated.  - hydroxychloroquine load and maintenance 2/2 to COVID infection  - not requiring NC at rest at this time. but titrate up as per COVID protocol, solumedrol 1mg/kg if on nonrebreather  - albuterol MDI PRN. pro BNP not elevated.  - hydroxychloroquine load and maintenance

## 2020-03-31 NOTE — DISCHARGE NOTE PROVIDER - NSDCCPCAREPLAN_GEN_ALL_CORE_FT
PRINCIPAL DISCHARGE DIAGNOSIS  Diagnosis: Infection due to 2019 novel coronavirus  Assessment and Plan of Treatment: PRINCIPAL DISCHARGE DIAGNOSIS  Diagnosis: Infection due to 2019 novel coronavirus  Assessment and Plan of Treatment: You tested positive for COVID 19.  You no longer require hospitalization.  Please restrict activities outside of your home except for getting medical care.  Do not go to work, school, or public areas.  Avoid using public transportation, ride-sharing, or taxis.  Separate yourself from other people and animals in your home.  Call ahead before visiting your doctor.  Wear a facemask when you are around other people. Cover your cough and sneezes.  Clean your hands often.  Avoid sharing personal household items.  Clean all frequently touched surfaces daily.   [facility] Team will be following up with you upon discharge  Refer to Information Packet provided by the system      SECONDARY DISCHARGE DIAGNOSES  Diagnosis: CAD (coronary artery disease)  Assessment and Plan of Treatment: Continue your Brilinta & ASA-81 as advised  Coronary artery disease is a condition where the arteries the supply the heart muscle get clogged with fatty deposits & puts you at risk for a heart attack  Call your doctor if you have any new pain, pressure, or discomfort in the center of your chest, pain, tingling or discomfort in arms, back, neck, jaw, or stomach, shortness of breath, nausea, vomiting, burping or heartburn, sweating, cold and clammy skin, racing or abnormal heartbeat for more than 10 minutes or if they keep coming & going.  Call 911 and do not tr to get to hospital by care  You can help yourself with lifestyle changes (quitting smoking if you smoke), eat lots of fruits & vegetables & low fat dairy products, not a lot of meat & fatty foods, walk or some form of physical activity most days of the week, lose weight if you are overweight  Take your cardiac medication as prescribed to lower cholesterol, to lower blood pressure, aspirin to prevent blood clots, and diabetes control  Make sure to keep appointments with doctor for cardiac follow up care    Diagnosis: HTN (hypertension)  Assessment and Plan of Treatment: Low salt diet  Activity as tolerated.  Take all medication as prescribed.  Follow up with your medical doctor for routine blood pressure monitoring at your next visit.  Notify your doctor if you have any of the following symptoms:   Dizziness, Lightheadedness, Blurry vision, Headache, Chest pain, Shortness of breath    Diagnosis: Diabetes mellitus of other type without complication  Assessment and Plan of Treatment: HgA1C this admission was 7.6.  Make sure you get your HgA1c checked every three months.  If you take oral diabetes medications, check your blood glucose two times a day.  It's important not to skip any meals.  Keep a log of your blood glucose results and always take it with you to your doctor appointments.  Keep a list of your current medications including injectables and over the counter medications and bring this medication list with you to all your doctor appointments.  If you have not seen your ophthalmologist this year call for appointment.  Check your feet daily for redness, sores, or openings. Do not self treat. If no improvement in two days call your primary care physician for an appointment.  Low blood sugar (hypoglycemia) is a blood sugar below 70mg/dl. Check your blood sugar if you feel signs/symptoms of hypoglycemia. If your blood sugar is below 70 take 15 grams of carbohydrates (ex 4 oz of apple juice, 3-4 glucose tablets, or 4-6 oz of regular soda) wait 15 minutes and repeat blood sugar to make sure it comes up above 70.  If your blood sugar is above 70 and you are due for a meal, have a meal.  If you are not due for a meal have a snack.  This snack helps keeps your blood sugar at a safe range.

## 2020-03-31 NOTE — DISCHARGE NOTE PROVIDER - NSDCFUADDAPPT_GEN_ALL_CORE_FT
** IF YOU DO NOT HAVE AN ESTABLISHED PCP, please call [phone] for follow-up  [facility] Teams will be following up with you outpatient as well

## 2020-03-31 NOTE — DISCHARGE NOTE PROVIDER - NSDCMRMEDTOKEN_GEN_ALL_CORE_FT
aspirin 81 mg oral delayed release tablet: 1 tab(s) orally once a day  aspirin 81 mg oral tablet: 1 tab(s) orally once a day  atorvastatin 80 mg oral tablet: 1 tab(s) orally once a day (at bedtime)  cyclobenzaprine 10 mg oral tablet: 1 tab(s) orally 3 times a day x 15 days   Lidoderm 5% topical film: Apply topically to affected area once a day  Take off after 12 hrs   Lipitor 20 mg oral tablet: 1 tab(s) orally once a day  lisinopril 2.5 mg oral tablet: 1 tab(s) orally once a day  metFORMIN 500 mg oral tablet, extended release: 1 tab(s) orally once a day  metFORMIN 750 mg oral tablet, extended release: 1 tab(s) orally once a day  metoprolol succinate 25 mg oral tablet, extended release: 1 tab(s) orally once a day  ticagrelor 90 mg oral tablet: 1 tab(s) orally 2 times a day  TRANS THORACIC ECHOCARDIOGRAM--LV FUNCTION: 1 day(s) buccal once a day aspirin 81 mg oral delayed release tablet: 1 tab(s) orally once a day  atorvastatin 80 mg oral tablet: 1 tab(s) orally once a day (at bedtime)  cyclobenzaprine 10 mg oral tablet: 1 tab(s) orally 3 times a day x 15 days   Lidoderm 5% topical film: Apply topically to affected area once a day  Take off after 12 hrs   Lipitor 20 mg oral tablet: 1 tab(s) orally once a day  lisinopril 2.5 mg oral tablet: 1 tab(s) orally once a day  metFORMIN 500 mg oral tablet, extended release: 1 tab(s) orally once a day  metFORMIN 750 mg oral tablet, extended release: 1 tab(s) orally once a day  metoprolol succinate 25 mg oral tablet, extended release: 1 tab(s) orally once a day  ticagrelor 90 mg oral tablet: 1 tab(s) orally 2 times a day aspirin 81 mg oral delayed release tablet: 1 tab(s) orally once a day  atorvastatin 40 mg oral tablet: 1 tab(s) orally once a day  Flomax 0.4 mg oral capsule: 1 cap(s) orally once a day  folic acid 1 mg oral tablet: 1 tab(s) orally once a day  lisinopril 2.5 mg oral tablet: 1 tab(s) orally once a day  metFORMIN 500 mg oral tablet, extended release: 1 tab(s) orally once a day  metFORMIN 750 mg oral tablet, extended release: 1 tab(s) orally once a day  metoprolol succinate 25 mg oral tablet, extended release: 1 tab(s) orally once a day  Singulair 10 mg oral tablet: 1 tab(s) orally once a day  ticagrelor 90 mg oral tablet: 1 tab(s) orally 2 times a day acetaminophen 325 mg oral tablet: 2 tab(s) orally every 6 hours, As Needed -Temp greater or equal to 38.5C (101.3F)   aspirin 81 mg oral delayed release tablet: 1 tab(s) orally once a day  atorvastatin 40 mg oral tablet: 1 tab(s) orally once a day  benzonatate 100 mg oral capsule: 2 cap(s) orally 3 times a day, As Needed for cough  Flomax 0.4 mg oral capsule: 1 cap(s) orally once a day  folic acid 1 mg oral tablet: 1 tab(s) orally once a day  lisinopril 2.5 mg oral tablet: 1 tab(s) orally once a day  metFORMIN 500 mg oral tablet, extended release: 1 tab(s) orally once a day  metFORMIN 750 mg oral tablet, extended release: 1 tab(s) orally once a day  metoprolol succinate 25 mg oral tablet, extended release: 1 tab(s) orally once a day  Singulair 10 mg oral tablet: 1 tab(s) orally once a day  ticagrelor 90 mg oral tablet: 1 tab(s) orally 2 times a day acetaminophen 325 mg oral tablet: 2 tab(s) orally every 6 hours, As Needed -Temp greater or equal to 38.5C (101.3F)   albuterol 90 mcg/inh inhalation aerosol: 2 puff(s) inhaled every 4 hours, As needed, Shortness of Breath and/or Wheezing  aspirin 81 mg oral delayed release tablet: 1 tab(s) orally once a day  atorvastatin 40 mg oral tablet: 1 tab(s) orally once a day  benzonatate 100 mg oral capsule: 2 cap(s) orally 3 times a day, As Needed for cough  Flomax 0.4 mg oral capsule: 1 cap(s) orally once a day  folic acid 1 mg oral tablet: 1 tab(s) orally once a day  guaiFENesin 100 mg/5 mL oral liquid: 5 milliliter(s) orally every 6 hours, As needed, Cough  Lidoderm 5% topical film: Apply topically to affected area once a day   Take off after 12 hrs  lisinopril 2.5 mg oral tablet: 1 tab(s) orally once a day  metFORMIN 500 mg oral tablet, extended release: 1 tab(s) orally once a day  metFORMIN 750 mg oral tablet, extended release: 1 tab(s) orally once a day  metoprolol succinate 25 mg oral tablet, extended release: 1 tab(s) orally once a day  Singulair 10 mg oral tablet: 1 tab(s) orally once a day  ticagrelor 90 mg oral tablet: 1 tab(s) orally 2 times a day

## 2020-03-31 NOTE — PROGRESS NOTE ADULT - PROBLEM SELECTOR PLAN 2
--COVID19 +  --c/w oxygen and supportive measures  --trend relevant serologies --COVID19+  --c/w oxygen and supportive measures  --trend relevant serologies

## 2020-03-31 NOTE — PROGRESS NOTE ADULT - PROBLEM SELECTOR PLAN 3
--with associated bandemia to 7%, Received CTX and azithromycin x 1 in ED as CXR infiltrate unilateral; however, since COVID positive less likely bacterial PNA.  - possible ID c/s?  - avoid IVF to prevent ARDS

## 2020-03-31 NOTE — PROVIDER CONTACT NOTE (OTHER) - ASSESSMENT
Patient resting bed on room air, however suddenly c/o chest pain and has become more tachypneic (25bpm-30bpm) and tachycardic (120s), requires 2L NC to maintain oxygen sat greater than 90%, temp is 103 degrees F

## 2020-03-31 NOTE — DISCHARGE NOTE PROVIDER - CARE PROVIDER_API CALL
PLEASE FOLLOW-UP WITH YOUR ESTABLISHED PCP AFTER 14-DAY QUARANTINE,   Phone: (   )    -  Fax: (   )    -  Follow Up Time: 2 weeks

## 2020-03-31 NOTE — DISCHARGE NOTE PROVIDER - HOSPITAL COURSE
59M PMH CAD s/p SUBHA x 2 (2017), borderline T2DM, HTN, HLD p/w cough, SOB, and fever. At ED vitals sig for /92, Temp 101, , O2 sat 91% RA but dropped to 88% on ambulation, CBC/CMP wnl, COVID +. Admitted for hypoxic resp failure 2/2 to COVID+. Pt placed on hydroxychloroquine, zinc, vitamin C, lovenox. 59M PMH CAD s/p SUBHA x 2 (2017), borderline T2DM, HTN, HLD p/w cough, SOB, and fever. At ED vitals sig for /92, Temp 101, , O2 sat 91% RA but dropped to 88% on ambulation, CBC/CMP wnl, COVID +. Admitted for hypoxic resp failure 2/2 to COVID+. Pt placed on hydroxychloroquine, zinc, vitamin C, lovenox, with symptomatic relief with incentive spirometry, robitussin and tessalon perle. Recurrent fevers controlled with tylenol intermittently. Pt O2 status improved during hospital course, able to saturate well on RA even on ambulation. Pt otherwise hemodynamically stable and medically optimized for discharge. Pt advised to self-quarantine himself from his family for at least 14 days after discharge. 59M PMH CAD s/p SUBHA x 2 (2017), borderline T2DM, HTN, HLD p/w cough, SOB, and fever. At ED vitals sig for /92, Temp 101, , O2 sat 91% RA but dropped to 88% on ambulation, CBC/CMP wnl, COVID +. Admitted for hypoxic resp failure 2/2 to COVID+. Pt placed on hydroxychloroquine, zinc, vitamin C, lovenox, with symptomatic relief with incentive spirometry, robitussin, hycadin, albuterol prn and tessalon perle. Recurrent fevers controlled with tylenol intermittently. Pt O2 status worsened initially during hospital course 2/2 unremitting coughs. Pt is currently on 5L of NC. 59M PMH CAD s/p SUBHA x 2 (2017), borderline T2DM, HTN, HLD p/w cough, SOB, and fever. At ED vitals sig for /92, Temp 101, , O2 sat 91% RA but dropped to 88% on ambulation, CBC/CMP wnl, COVID +. Admitted for hypoxic resp failure 2/2 to COVID+. Pt placed on hydroxychloroquine, zinc, vitamin C, lovenox, with symptomatic relief with incentive spirometry, robitussin, hycodan, albuterol prn and tessalon perle. Recurrent fevers controlled with tylenol intermittently. Pt O2 status worsened initially during hospital course 2/2 unremitting coughs. Patient now on RA, with spo2>90%, deemed medically stable for discharge home, plan for [facility] for close monitoring, and advised to follow-up with established PCP after 14-day quarantine.

## 2020-04-01 LAB
ALBUMIN SERPL ELPH-MCNC: 3.3 G/DL — SIGNIFICANT CHANGE UP (ref 3.3–5)
ALP SERPL-CCNC: 69 U/L — SIGNIFICANT CHANGE UP (ref 40–120)
ALT FLD-CCNC: 33 U/L — SIGNIFICANT CHANGE UP (ref 10–45)
ANION GAP SERPL CALC-SCNC: 12 MMOL/L — SIGNIFICANT CHANGE UP (ref 5–17)
AST SERPL-CCNC: 31 U/L — SIGNIFICANT CHANGE UP (ref 10–40)
BASOPHILS # BLD AUTO: 0.04 K/UL — SIGNIFICANT CHANGE UP (ref 0–0.2)
BASOPHILS NFR BLD AUTO: 0.6 % — SIGNIFICANT CHANGE UP (ref 0–2)
BILIRUB SERPL-MCNC: 0.6 MG/DL — SIGNIFICANT CHANGE UP (ref 0.2–1.2)
BUN SERPL-MCNC: 17 MG/DL — SIGNIFICANT CHANGE UP (ref 7–23)
CALCIUM SERPL-MCNC: 8.5 MG/DL — SIGNIFICANT CHANGE UP (ref 8.4–10.5)
CHLORIDE SERPL-SCNC: 93 MMOL/L — LOW (ref 96–108)
CO2 SERPL-SCNC: 27 MMOL/L — SIGNIFICANT CHANGE UP (ref 22–31)
CREAT SERPL-MCNC: 1.24 MG/DL — SIGNIFICANT CHANGE UP (ref 0.5–1.3)
CRP SERPL-MCNC: 3.02 MG/DL — HIGH (ref 0–0.4)
EOSINOPHIL # BLD AUTO: 0.05 K/UL — SIGNIFICANT CHANGE UP (ref 0–0.5)
EOSINOPHIL NFR BLD AUTO: 0.8 % — SIGNIFICANT CHANGE UP (ref 0–6)
GLUCOSE BLDC GLUCOMTR-MCNC: 112 MG/DL — HIGH (ref 70–99)
GLUCOSE BLDC GLUCOMTR-MCNC: 130 MG/DL — HIGH (ref 70–99)
GLUCOSE BLDC GLUCOMTR-MCNC: 137 MG/DL — HIGH (ref 70–99)
GLUCOSE BLDC GLUCOMTR-MCNC: 139 MG/DL — HIGH (ref 70–99)
GLUCOSE BLDC GLUCOMTR-MCNC: 143 MG/DL — HIGH (ref 70–99)
GLUCOSE SERPL-MCNC: 144 MG/DL — HIGH (ref 70–99)
HCT VFR BLD CALC: 44.7 % — SIGNIFICANT CHANGE UP (ref 39–50)
HGB BLD-MCNC: 14.9 G/DL — SIGNIFICANT CHANGE UP (ref 13–17)
IMM GRANULOCYTES NFR BLD AUTO: 2.7 % — HIGH (ref 0–1.5)
LYMPHOCYTES # BLD AUTO: 1.42 K/UL — SIGNIFICANT CHANGE UP (ref 1–3.3)
LYMPHOCYTES # BLD AUTO: 22.5 % — SIGNIFICANT CHANGE UP (ref 13–44)
MCHC RBC-ENTMCNC: 28.8 PG — SIGNIFICANT CHANGE UP (ref 27–34)
MCHC RBC-ENTMCNC: 33.3 GM/DL — SIGNIFICANT CHANGE UP (ref 32–36)
MCV RBC AUTO: 86.5 FL — SIGNIFICANT CHANGE UP (ref 80–100)
MONOCYTES # BLD AUTO: 0.45 K/UL — SIGNIFICANT CHANGE UP (ref 0–0.9)
MONOCYTES NFR BLD AUTO: 7.1 % — SIGNIFICANT CHANGE UP (ref 2–14)
NEUTROPHILS # BLD AUTO: 4.19 K/UL — SIGNIFICANT CHANGE UP (ref 1.8–7.4)
NEUTROPHILS NFR BLD AUTO: 66.3 % — SIGNIFICANT CHANGE UP (ref 43–77)
NRBC # BLD: 0 /100 WBCS — SIGNIFICANT CHANGE UP (ref 0–0)
PLATELET # BLD AUTO: 154 K/UL — SIGNIFICANT CHANGE UP (ref 150–400)
POTASSIUM SERPL-MCNC: 4.2 MMOL/L — SIGNIFICANT CHANGE UP (ref 3.5–5.3)
POTASSIUM SERPL-SCNC: 4.2 MMOL/L — SIGNIFICANT CHANGE UP (ref 3.5–5.3)
PROT SERPL-MCNC: 6.9 G/DL — SIGNIFICANT CHANGE UP (ref 6–8.3)
RBC # BLD: 5.17 M/UL — SIGNIFICANT CHANGE UP (ref 4.2–5.8)
RBC # FLD: 11.9 % — SIGNIFICANT CHANGE UP (ref 10.3–14.5)
SODIUM SERPL-SCNC: 132 MMOL/L — LOW (ref 135–145)
WBC # BLD: 6.32 K/UL — SIGNIFICANT CHANGE UP (ref 3.8–10.5)
WBC # FLD AUTO: 6.32 K/UL — SIGNIFICANT CHANGE UP (ref 3.8–10.5)

## 2020-04-01 PROCEDURE — 99233 SBSQ HOSP IP/OBS HIGH 50: CPT | Mod: GC

## 2020-04-01 RX ORDER — FOLIC ACID 0.8 MG
1 TABLET ORAL DAILY
Refills: 0 | Status: DISCONTINUED | OUTPATIENT
Start: 2020-04-01 | End: 2020-04-08

## 2020-04-01 RX ORDER — ATORVASTATIN CALCIUM 80 MG/1
40 TABLET, FILM COATED ORAL AT BEDTIME
Refills: 0 | Status: DISCONTINUED | OUTPATIENT
Start: 2020-04-01 | End: 2020-04-08

## 2020-04-01 RX ORDER — ATORVASTATIN CALCIUM 80 MG/1
1 TABLET, FILM COATED ORAL
Qty: 0 | Refills: 0 | DISCHARGE

## 2020-04-01 RX ORDER — METOPROLOL TARTRATE 50 MG
25 TABLET ORAL DAILY
Refills: 0 | Status: DISCONTINUED | OUTPATIENT
Start: 2020-04-01 | End: 2020-04-08

## 2020-04-01 RX ORDER — TAMSULOSIN HYDROCHLORIDE 0.4 MG/1
0.4 CAPSULE ORAL AT BEDTIME
Refills: 0 | Status: DISCONTINUED | OUTPATIENT
Start: 2020-04-01 | End: 2020-04-08

## 2020-04-01 RX ORDER — MONTELUKAST 4 MG/1
10 TABLET, CHEWABLE ORAL DAILY
Refills: 0 | Status: DISCONTINUED | OUTPATIENT
Start: 2020-04-01 | End: 2020-04-08

## 2020-04-01 RX ADMIN — TAMSULOSIN HYDROCHLORIDE 0.4 MILLIGRAM(S): 0.4 CAPSULE ORAL at 23:28

## 2020-04-01 RX ADMIN — Medication 1000 MILLIGRAM(S): at 11:32

## 2020-04-01 RX ADMIN — Medication 100 MILLIGRAM(S): at 17:23

## 2020-04-01 RX ADMIN — ATORVASTATIN CALCIUM 40 MILLIGRAM(S): 80 TABLET, FILM COATED ORAL at 23:26

## 2020-04-01 RX ADMIN — ZINC SULFATE TAB 220 MG (50 MG ZINC EQUIVALENT) 220 MILLIGRAM(S): 220 (50 ZN) TAB at 11:32

## 2020-04-01 RX ADMIN — Medication 200 MILLIGRAM(S): at 17:23

## 2020-04-01 RX ADMIN — Medication 81 MILLIGRAM(S): at 11:32

## 2020-04-01 RX ADMIN — Medication 100 MILLIGRAM(S): at 23:27

## 2020-04-01 RX ADMIN — Medication 100 MILLIGRAM(S): at 05:10

## 2020-04-01 RX ADMIN — Medication 1 MILLIGRAM(S): at 18:31

## 2020-04-01 RX ADMIN — ENOXAPARIN SODIUM 40 MILLIGRAM(S): 100 INJECTION SUBCUTANEOUS at 11:32

## 2020-04-01 RX ADMIN — Medication 200 MILLIGRAM(S): at 05:10

## 2020-04-01 NOTE — DIETITIAN INITIAL EVALUATION ADULT. - ADD RECOMMEND
1) Continue current diet as tolerated. Encourage PO intake of protein-rich foods 2) RD to add Diet Mighty Shakes TID. 3) Diet education deferred at this time. 4) RD to remain available and follow-up as medically appropriate.

## 2020-04-01 NOTE — PROGRESS NOTE ADULT - PROBLEM SELECTOR PLAN 1
2/2 to COVID infection  - currently on 2L NC but titrate up as per COVID protocol, solumedrol 1mg/kg if on nonrebreather  - albuterol MDI PRN. pro BNP not elevated.  - hydroxychloroquine load and maintenance 2/2 to COVID infection  - currently on 1L NC but titrate up as per COVID protocol, solumedrol 1mg/kg if on nonrebreather,  - symptomatic treatment w/ albuterol inhaler, tessalon, robitussin and hycadin  - hydroxychloroquine load and maintenance

## 2020-04-01 NOTE — PROGRESS NOTE ADULT - PROBLEM SELECTOR PLAN 4
stents 3 years ago, c/w aspirin and statin  --no chest pain. Non ischemic EKG. Will check trop as part of COVID order set. stents 3 years ago, c/w aspirin and statin  - chest pain only from coughing. Non ischemic EKG. Trops wnl.

## 2020-04-01 NOTE — DIETITIAN INITIAL EVALUATION ADULT. - OTHER INFO
Pertinent Information: This is a 59M PMH CAD s/p SUBHA x 2 (2017), borderline T2DM, HTN, HLD p/w cough, SOB, and fever. Symptoms started 3-4 days ago. Fevers and chills at home. SOB started about 2 days ago and has been progressively worsening, prompting him to come to the hospital. Covid positive.     Unable to conduct a face to face interview or nutrition-focused physical exam due to limited contact restrictions related to patient's medical condition and isolation precaution. Spoke with patient over the phone, interview kept brief due to patient coughing. Pt reports good PO intake and appetite at home, was following regular diet, confirms NKFA,. Pt denies chewing/swallowing difficulty, nausea, vomiting, diarrhea, constipation PTA. Takes vitamin D supplementation. Per chart pt noted with prediabetes, current HbA1c: 7.6%, takes metformin at home. pt reports he doesn't check BG at home. Recommend outpatient nutrition follow-up.     Weights: Pt reports UBW as ~ 150lbs, current dosing weight is 150.7lbs (3/19), [facility] noted as 156lbs (1/11/17) suggesting relatively stable weight over the last 3 years.     Since admission pt reports appetite is decreased due to coughing, states he does feel like eating, consumed <50% of breakfast today. Pt encouraged to prioritize protein during meals. Pt amendable to trying Sugar Free Mighty Shakes. Diet education deferred at this time. Patient with no nutrition-related questions at this time. Made aware RD remains available as needed.

## 2020-04-01 NOTE — PROGRESS NOTE ADULT - PROBLEM SELECTOR PLAN 3
with associated bandemia to 7%, Received CTX and azithromycin x 1 in ED as CXR infiltrate unilateral; however, since COVID positive less likely bacterial PNA.  - avoid IVF to prevent ARDS  - hold abx at this time

## 2020-04-01 NOTE — DIETITIAN INITIAL EVALUATION ADULT. - PHYSICAL APPEARANCE
other (specify) Ht: 64 inches, Wt: 151lbs, BMI: 25.9kg/m2, IBW: 130 lbs +/- 10%, %IBW: 116%  Edema: none, Skin: free of pressure injuries per nursing flow sheets

## 2020-04-01 NOTE — PROGRESS NOTE ADULT - SUBJECTIVE AND OBJECTIVE BOX
Patient is a 59y old  Male who presents with a chief complaint of cough, SOB (31 Mar 2020 09:09)      SUBJECTIVE / OVERNIGHT EVENTS:    MEDICATIONS  (STANDING):  ascorbic acid 1000 milliGRAM(s) Oral daily  aspirin enteric coated 81 milliGRAM(s) Oral daily  atorvastatin 20 milliGRAM(s) Oral at bedtime  benzonatate 100 milliGRAM(s) Oral three times a day  dextrose 5%. 1000 milliLiter(s) (50 mL/Hr) IV Continuous <Continuous>  dextrose 50% Injectable 12.5 Gram(s) IV Push once  dextrose 50% Injectable 25 Gram(s) IV Push once  dextrose 50% Injectable 25 Gram(s) IV Push once  enoxaparin Injectable 40 milliGRAM(s) SubCutaneous daily  hydrocodone/homatropine Syrup 5 milliLiter(s) Oral two times a day  hydroxychloroquine   Oral   hydroxychloroquine 200 milliGRAM(s) Oral every 12 hours  insulin lispro (HumaLOG) corrective regimen sliding scale   SubCutaneous three times a day before meals  insulin lispro (HumaLOG) corrective regimen sliding scale   SubCutaneous at bedtime  zinc sulfate 220 milliGRAM(s) Oral daily    MEDICATIONS  (PRN):  acetaminophen   Tablet .. 650 milliGRAM(s) Oral every 4 hours PRN Temp greater or equal to 38.5C (101.3F)  ALBUTerol    90 MICROgram(s) HFA Inhaler 2 Puff(s) Inhalation every 4 hours PRN Shortness of Breath and/or Wheezing  dextrose 40% Gel 15 Gram(s) Oral once PRN Blood Glucose LESS THAN 70 milliGRAM(s)/deciliter  glucagon  Injectable 1 milliGRAM(s) IntraMuscular once PRN Glucose LESS THAN 70 milligrams/deciliter  guaiFENesin   Syrup  (Sugar-Free) 100 milliGRAM(s) Oral every 6 hours PRN Cough      Vital Signs Last 24 Hrs  T(C): 37 (01 Apr 2020 05:05), Max: 39.4 (31 Mar 2020 12:56)  T(F): 98.6 (01 Apr 2020 05:05), Max: 103 (31 Mar 2020 12:56)  HR: 85 (01 Apr 2020 05:05) (85 - 118)  BP: 144/93 (01 Apr 2020 05:05) (114/78 - 144/93)  BP(mean): --  RR: 19 (01 Apr 2020 05:05) (19 - 24)  SpO2: 94% (01 Apr 2020 05:05) (90% - 95%)  CAPILLARY BLOOD GLUCOSE      POCT Blood Glucose.: 136 mg/dL (31 Mar 2020 11:49)    I&O's Summary    30 Mar 2020 07:01  -  31 Mar 2020 07:00  --------------------------------------------------------  IN: 1380 mL / OUT: 1550 mL / NET: -170 mL    31 Mar 2020 07:01  -  01 Apr 2020 06:55  --------------------------------------------------------  IN: 780 mL / OUT: 1350 mL / NET: -570 mL        PHYSICAL EXAM:  GENERAL: NAD, well-developed  HEAD:  Atraumatic, Normocephalic  EYES: EOMI, PERRLA, conjunctiva and sclera clear  NECK: Supple,  CHEST/LUNG: Mild expiratory wheezing at the left lower lobe  HEART: Regular rate and rhythm; No murmurs, rubs, or gallops  ABDOMEN: Soft, Nontender, Nondistended; Bowel sounds present  EXTREMITIES:  2+ Peripheral Pulses, No clubbing, cyanosis, or edema  PSYCH: AAOx3  NEUROLOGY: non-focal  SKIN: No rashes or lesions      LABS:                        15.6   5.81  )-----------( 180      ( 31 Mar 2020 06:59 )             48.0     03-31    137  |  97  |  11  ----------------------------<  156<H>  3.7   |  26  |  1.05    Ca    8.7      31 Mar 2020 06:59    TPro  7.2  /  Alb  3.4  /  TBili  0.6  /  DBili  x   /  AST  30  /  ALT  36  /  AlkPhos  78  03-31    PT/INR - ( 30 Mar 2020 08:19 )   PT: 12.6 sec;   INR: 1.10 ratio         PTT - ( 30 Mar 2020 08:19 )  PTT:27.8 sec  CARDIAC MARKERS ( 31 Mar 2020 13:55 )  x     / x     / 126 U/L / x     / <1.0 ng/mL      RADIOLOGY & ADDITIONAL TESTS:    Imaging Personally Reviewed:    Consultant(s) Notes Reviewed:      Care Discussed with Consultants/Other Providers: Patient is a 59y old  Male who presents with a chief complaint of cough, SOB (31 Mar 2020 09:09)      SUBJECTIVE / OVERNIGHT EVENTS:    Pt appearing anxious this morning due to persistent coughs. Pt seen taking on small shallow breaths, but saturating well at 1L NC. Pt reports episode of fever yesterday afternoon but no further fevers throughout the day. Otherwise no chills, chest p/p, sob, n/v/d throughout the night.     MEDICATIONS  (STANDING):  ascorbic acid 1000 milliGRAM(s) Oral daily  aspirin enteric coated 81 milliGRAM(s) Oral daily  atorvastatin 20 milliGRAM(s) Oral at bedtime  benzonatate 100 milliGRAM(s) Oral three times a day  dextrose 5%. 1000 milliLiter(s) (50 mL/Hr) IV Continuous <Continuous>  dextrose 50% Injectable 12.5 Gram(s) IV Push once  dextrose 50% Injectable 25 Gram(s) IV Push once  dextrose 50% Injectable 25 Gram(s) IV Push once  enoxaparin Injectable 40 milliGRAM(s) SubCutaneous daily  hydrocodone/homatropine Syrup 5 milliLiter(s) Oral two times a day  hydroxychloroquine   Oral   hydroxychloroquine 200 milliGRAM(s) Oral every 12 hours  insulin lispro (HumaLOG) corrective regimen sliding scale   SubCutaneous three times a day before meals  insulin lispro (HumaLOG) corrective regimen sliding scale   SubCutaneous at bedtime  zinc sulfate 220 milliGRAM(s) Oral daily    MEDICATIONS  (PRN):  acetaminophen   Tablet .. 650 milliGRAM(s) Oral every 4 hours PRN Temp greater or equal to 38.5C (101.3F)  ALBUTerol    90 MICROgram(s) HFA Inhaler 2 Puff(s) Inhalation every 4 hours PRN Shortness of Breath and/or Wheezing  dextrose 40% Gel 15 Gram(s) Oral once PRN Blood Glucose LESS THAN 70 milliGRAM(s)/deciliter  glucagon  Injectable 1 milliGRAM(s) IntraMuscular once PRN Glucose LESS THAN 70 milligrams/deciliter  guaiFENesin   Syrup  (Sugar-Free) 100 milliGRAM(s) Oral every 6 hours PRN Cough      Vital Signs Last 24 Hrs  T(C): 37 (01 Apr 2020 05:05), Max: 39.4 (31 Mar 2020 12:56)  T(F): 98.6 (01 Apr 2020 05:05), Max: 103 (31 Mar 2020 12:56)  HR: 85 (01 Apr 2020 05:05) (85 - 118)  BP: 144/93 (01 Apr 2020 05:05) (114/78 - 144/93)  BP(mean): --  RR: 19 (01 Apr 2020 05:05) (19 - 24)  SpO2: 94% (01 Apr 2020 05:05) (90% - 95%)  CAPILLARY BLOOD GLUCOSE      POCT Blood Glucose.: 136 mg/dL (31 Mar 2020 11:49)    I&O's Summary    30 Mar 2020 07:01  -  31 Mar 2020 07:00  --------------------------------------------------------  IN: 1380 mL / OUT: 1550 mL / NET: -170 mL    31 Mar 2020 07:01  -  01 Apr 2020 06:55  --------------------------------------------------------  IN: 780 mL / OUT: 1350 mL / NET: -570 mL        PHYSICAL EXAM:  GENERAL: NAD, well-developed  HEAD:  Atraumatic, Normocephalic  EYES: EOMI, PERRLA, conjunctiva and sclera clear  NECK: Supple,  CHEST/LUNG: Mild expiratory wheezing at the left lower lobe  HEART: Regular rate and rhythm; No murmurs, rubs, or gallops  ABDOMEN: Soft, Nontender, Nondistended; Bowel sounds present  EXTREMITIES:  2+ Peripheral Pulses, No clubbing, cyanosis, or edema  PSYCH: AAOx3  NEUROLOGY: non-focal  SKIN: No rashes or lesions      LABS:                        15.6   5.81  )-----------( 180      ( 31 Mar 2020 06:59 )             48.0     03-31    137  |  97  |  11  ----------------------------<  156<H>  3.7   |  26  |  1.05    Ca    8.7      31 Mar 2020 06:59    TPro  7.2  /  Alb  3.4  /  TBili  0.6  /  DBili  x   /  AST  30  /  ALT  36  /  AlkPhos  78  03-31    PT/INR - ( 30 Mar 2020 08:19 )   PT: 12.6 sec;   INR: 1.10 ratio         PTT - ( 30 Mar 2020 08:19 )  PTT:27.8 sec  CARDIAC MARKERS ( 31 Mar 2020 13:55 )  x     / x     / 126 U/L / x     / <1.0 ng/mL      RADIOLOGY & ADDITIONAL TESTS:    Imaging Personally Reviewed:    Consultant(s) Notes Reviewed:      Care Discussed with Consultants/Other Providers:

## 2020-04-02 LAB
ALBUMIN SERPL ELPH-MCNC: 3.6 G/DL — SIGNIFICANT CHANGE UP (ref 3.3–5)
ALP SERPL-CCNC: 67 U/L — SIGNIFICANT CHANGE UP (ref 40–120)
ALT FLD-CCNC: 32 U/L — SIGNIFICANT CHANGE UP (ref 10–45)
ANION GAP SERPL CALC-SCNC: 14 MMOL/L — SIGNIFICANT CHANGE UP (ref 5–17)
AST SERPL-CCNC: 36 U/L — SIGNIFICANT CHANGE UP (ref 10–40)
BASOPHILS # BLD AUTO: 0.03 K/UL — SIGNIFICANT CHANGE UP (ref 0–0.2)
BASOPHILS NFR BLD AUTO: 0.4 % — SIGNIFICANT CHANGE UP (ref 0–2)
BILIRUB SERPL-MCNC: 0.7 MG/DL — SIGNIFICANT CHANGE UP (ref 0.2–1.2)
BUN SERPL-MCNC: 14 MG/DL — SIGNIFICANT CHANGE UP (ref 7–23)
CALCIUM SERPL-MCNC: 8.4 MG/DL — SIGNIFICANT CHANGE UP (ref 8.4–10.5)
CHLORIDE SERPL-SCNC: 94 MMOL/L — LOW (ref 96–108)
CO2 SERPL-SCNC: 26 MMOL/L — SIGNIFICANT CHANGE UP (ref 22–31)
CREAT SERPL-MCNC: 1.08 MG/DL — SIGNIFICANT CHANGE UP (ref 0.5–1.3)
D DIMER BLD IA.RAPID-MCNC: 190 NG/ML DDU — SIGNIFICANT CHANGE UP
EOSINOPHIL # BLD AUTO: 0.01 K/UL — SIGNIFICANT CHANGE UP (ref 0–0.5)
EOSINOPHIL NFR BLD AUTO: 0.1 % — SIGNIFICANT CHANGE UP (ref 0–6)
FIBRINOGEN AG PPP IA-MCNC: 346 MG/DL — SIGNIFICANT CHANGE UP
GLUCOSE BLDC GLUCOMTR-MCNC: 126 MG/DL — HIGH (ref 70–99)
GLUCOSE BLDC GLUCOMTR-MCNC: 140 MG/DL — HIGH (ref 70–99)
GLUCOSE BLDC GLUCOMTR-MCNC: 150 MG/DL — HIGH (ref 70–99)
GLUCOSE SERPL-MCNC: 150 MG/DL — HIGH (ref 70–99)
HCT VFR BLD CALC: 43.5 % — SIGNIFICANT CHANGE UP (ref 39–50)
HGB BLD-MCNC: 15 G/DL — SIGNIFICANT CHANGE UP (ref 13–17)
IMM GRANULOCYTES NFR BLD AUTO: 1.9 % — HIGH (ref 0–1.5)
LDH SERPL L TO P-CCNC: 326 U/L — HIGH (ref 50–242)
LYMPHOCYTES # BLD AUTO: 1.52 K/UL — SIGNIFICANT CHANGE UP (ref 1–3.3)
LYMPHOCYTES # BLD AUTO: 20.3 % — SIGNIFICANT CHANGE UP (ref 13–44)
MCHC RBC-ENTMCNC: 29.6 PG — SIGNIFICANT CHANGE UP (ref 27–34)
MCHC RBC-ENTMCNC: 34.5 GM/DL — SIGNIFICANT CHANGE UP (ref 32–36)
MCV RBC AUTO: 86 FL — SIGNIFICANT CHANGE UP (ref 80–100)
MONOCYTES # BLD AUTO: 0.28 K/UL — SIGNIFICANT CHANGE UP (ref 0–0.9)
MONOCYTES NFR BLD AUTO: 3.7 % — SIGNIFICANT CHANGE UP (ref 2–14)
NEUTROPHILS # BLD AUTO: 5.52 K/UL — SIGNIFICANT CHANGE UP (ref 1.8–7.4)
NEUTROPHILS NFR BLD AUTO: 73.6 % — SIGNIFICANT CHANGE UP (ref 43–77)
NRBC # BLD: 0 /100 WBCS — SIGNIFICANT CHANGE UP (ref 0–0)
PLATELET # BLD AUTO: 152 K/UL — SIGNIFICANT CHANGE UP (ref 150–400)
POTASSIUM SERPL-MCNC: 3.7 MMOL/L — SIGNIFICANT CHANGE UP (ref 3.5–5.3)
POTASSIUM SERPL-SCNC: 3.7 MMOL/L — SIGNIFICANT CHANGE UP (ref 3.5–5.3)
PROT SERPL-MCNC: 7.1 G/DL — SIGNIFICANT CHANGE UP (ref 6–8.3)
RBC # BLD: 5.06 M/UL — SIGNIFICANT CHANGE UP (ref 4.2–5.8)
RBC # FLD: 11.9 % — SIGNIFICANT CHANGE UP (ref 10.3–14.5)
SODIUM SERPL-SCNC: 134 MMOL/L — LOW (ref 135–145)
WBC # BLD: 7.5 K/UL — SIGNIFICANT CHANGE UP (ref 3.8–10.5)
WBC # FLD AUTO: 7.5 K/UL — SIGNIFICANT CHANGE UP (ref 3.8–10.5)

## 2020-04-02 PROCEDURE — 99233 SBSQ HOSP IP/OBS HIGH 50: CPT | Mod: GC

## 2020-04-02 RX ORDER — FOLIC ACID 0.8 MG
1 TABLET ORAL
Qty: 0 | Refills: 0 | DISCHARGE

## 2020-04-02 RX ORDER — METFORMIN HYDROCHLORIDE 850 MG/1
1 TABLET ORAL
Qty: 0 | Refills: 0 | DISCHARGE

## 2020-04-02 RX ORDER — MONTELUKAST 4 MG/1
1 TABLET, CHEWABLE ORAL
Qty: 0 | Refills: 0 | DISCHARGE

## 2020-04-02 RX ORDER — TAMSULOSIN HYDROCHLORIDE 0.4 MG/1
1 CAPSULE ORAL
Qty: 0 | Refills: 0 | DISCHARGE

## 2020-04-02 RX ORDER — ATORVASTATIN CALCIUM 80 MG/1
1 TABLET, FILM COATED ORAL
Qty: 0 | Refills: 0 | DISCHARGE

## 2020-04-02 RX ADMIN — Medication 100 MILLIGRAM(S): at 21:34

## 2020-04-02 RX ADMIN — Medication 200 MILLIGRAM(S): at 17:52

## 2020-04-02 RX ADMIN — ENOXAPARIN SODIUM 40 MILLIGRAM(S): 100 INJECTION SUBCUTANEOUS at 14:00

## 2020-04-02 RX ADMIN — ATORVASTATIN CALCIUM 40 MILLIGRAM(S): 80 TABLET, FILM COATED ORAL at 21:33

## 2020-04-02 RX ADMIN — Medication 100 MILLIGRAM(S): at 05:29

## 2020-04-02 RX ADMIN — Medication 100 MILLIGRAM(S): at 14:00

## 2020-04-02 RX ADMIN — Medication 650 MILLIGRAM(S): at 17:30

## 2020-04-02 RX ADMIN — Medication 81 MILLIGRAM(S): at 13:59

## 2020-04-02 RX ADMIN — MONTELUKAST 10 MILLIGRAM(S): 4 TABLET, CHEWABLE ORAL at 14:01

## 2020-04-02 RX ADMIN — ZINC SULFATE TAB 220 MG (50 MG ZINC EQUIVALENT) 220 MILLIGRAM(S): 220 (50 ZN) TAB at 14:01

## 2020-04-02 RX ADMIN — Medication 200 MILLIGRAM(S): at 05:29

## 2020-04-02 RX ADMIN — TAMSULOSIN HYDROCHLORIDE 0.4 MILLIGRAM(S): 0.4 CAPSULE ORAL at 21:34

## 2020-04-02 RX ADMIN — Medication 1000 MILLIGRAM(S): at 13:59

## 2020-04-02 RX ADMIN — Medication 650 MILLIGRAM(S): at 00:38

## 2020-04-02 RX ADMIN — Medication 25 MILLIGRAM(S): at 05:29

## 2020-04-02 RX ADMIN — Medication 1 MILLIGRAM(S): at 14:00

## 2020-04-02 RX ADMIN — ALBUTEROL 2 PUFF(S): 90 AEROSOL, METERED ORAL at 00:25

## 2020-04-02 NOTE — PROGRESS NOTE ADULT - PROBLEM SELECTOR PLAN 1
2/2 to COVID infection  - currently on 1L NC but titrate up as per COVID protocol, solumedrol 1mg/kg if on nonrebreather,  - symptomatic treatment w/ albuterol inhaler, tessalon, robitussin and hycadin  - hydroxychloroquine load and maintenance 2/2 to COVID infection  - currently on 3L NC but titrate up as per COVID protocol, solumedrol 1mg/kg if on nonrebreather,  - symptomatic treatment w/ albuterol inhaler, tessalon, robitussin and hycadin  - hydroxychloroquine load and maintenance

## 2020-04-02 NOTE — PROGRESS NOTE ADULT - SUBJECTIVE AND OBJECTIVE BOX
Patient is a 59y old  Male who presents with a chief complaint of cough, SOB (01 Apr 2020 06:55)      SUBJECTIVE / OVERNIGHT EVENTS:    MEDICATIONS  (STANDING):  ascorbic acid 1000 milliGRAM(s) Oral daily  aspirin enteric coated 81 milliGRAM(s) Oral daily  atorvastatin 40 milliGRAM(s) Oral at bedtime  benzonatate 100 milliGRAM(s) Oral three times a day  dextrose 5%. 1000 milliLiter(s) (50 mL/Hr) IV Continuous <Continuous>  dextrose 50% Injectable 12.5 Gram(s) IV Push once  dextrose 50% Injectable 25 Gram(s) IV Push once  dextrose 50% Injectable 25 Gram(s) IV Push once  enoxaparin Injectable 40 milliGRAM(s) SubCutaneous daily  folic acid 1 milliGRAM(s) Oral daily  hydrocodone/homatropine Syrup 5 milliLiter(s) Oral two times a day  hydroxychloroquine   Oral   hydroxychloroquine 200 milliGRAM(s) Oral every 12 hours  insulin lispro (HumaLOG) corrective regimen sliding scale   SubCutaneous three times a day before meals  insulin lispro (HumaLOG) corrective regimen sliding scale   SubCutaneous at bedtime  metoprolol succinate ER 25 milliGRAM(s) Oral daily  montelukast 10 milliGRAM(s) Oral daily  tamsulosin 0.4 milliGRAM(s) Oral at bedtime  zinc sulfate 220 milliGRAM(s) Oral daily    MEDICATIONS  (PRN):  acetaminophen   Tablet .. 650 milliGRAM(s) Oral every 4 hours PRN Temp greater or equal to 38.5C (101.3F)  ALBUTerol    90 MICROgram(s) HFA Inhaler 2 Puff(s) Inhalation every 4 hours PRN Shortness of Breath and/or Wheezing  dextrose 40% Gel 15 Gram(s) Oral once PRN Blood Glucose LESS THAN 70 milliGRAM(s)/deciliter  glucagon  Injectable 1 milliGRAM(s) IntraMuscular once PRN Glucose LESS THAN 70 milligrams/deciliter  guaiFENesin   Syrup  (Sugar-Free) 100 milliGRAM(s) Oral every 6 hours PRN Cough      Vital Signs Last 24 Hrs  T(C): 36.8 (02 Apr 2020 05:30), Max: 38.9 (02 Apr 2020 00:23)  T(F): 98.2 (02 Apr 2020 05:30), Max: 102.1 (02 Apr 2020 00:23)  HR: 98 (02 Apr 2020 05:30) (83 - 103)  BP: 128/85 (02 Apr 2020 05:30) (103/67 - 133/83)  BP(mean): --  RR: 20 (02 Apr 2020 05:30) (19 - 20)  SpO2: 92% (02 Apr 2020 05:30) (92% - 96%)  CAPILLARY BLOOD GLUCOSE      POCT Blood Glucose.: 112 mg/dL (01 Apr 2020 17:17)  POCT Blood Glucose.: 139 mg/dL (01 Apr 2020 11:27)  POCT Blood Glucose.: 137 mg/dL (01 Apr 2020 07:35)    I&O's Summary    31 Mar 2020 07:01  -  01 Apr 2020 07:00  --------------------------------------------------------  IN: 780 mL / OUT: 1350 mL / NET: -570 mL    01 Apr 2020 07:01  -  02 Apr 2020 06:52  --------------------------------------------------------  IN: 600 mL / OUT: 825 mL / NET: -225 mL        PHYSICAL EXAM:  GENERAL: NAD, well-developed  HEAD:  Atraumatic, Normocephalic  EYES: EOMI, PERRLA, conjunctiva and sclera clear  NECK: Supple,  CHEST/LUNG: Mild expiratory wheezing at the left lower lobe  HEART: Regular rate and rhythm; No murmurs, rubs, or gallops  ABDOMEN: Soft, Nontender, Nondistended; Bowel sounds present  EXTREMITIES:  2+ Peripheral Pulses, No clubbing, cyanosis, or edema  PSYCH: AAOx3  NEUROLOGY: non-focal  SKIN: No rashes or lesions    LABS:                        14.9   6.32  )-----------( 154      ( 01 Apr 2020 07:38 )             44.7     04-01    132<L>  |  93<L>  |  17  ----------------------------<  144<H>  4.2   |  27  |  1.24    Ca    8.5      01 Apr 2020 07:32    TPro  6.9  /  Alb  3.3  /  TBili  0.6  /  DBili  x   /  AST  31  /  ALT  33  /  AlkPhos  69  04-01      CARDIAC MARKERS ( 31 Mar 2020 13:55 )  x     / x     / 126 U/L / x     / <1.0 ng/mL          RADIOLOGY & ADDITIONAL TESTS:    Imaging Personally Reviewed:    Consultant(s) Notes Reviewed:      Care Discussed with Consultants/Other Providers: Patient is a 59y old  Male who presents with a chief complaint of cough, SOB (01 Apr 2020 06:55)      SUBJECTIVE / OVERNIGHT EVENTS:    Pt feeling unwell this morning, reports shortness of breath, worse yesterday but improved today, scared to go home. Report of fever up to 102 yesterday, otherwise denies fevers, chills, chest p/p, sob, n/v/d.     MEDICATIONS  (STANDING):  ascorbic acid 1000 milliGRAM(s) Oral daily  aspirin enteric coated 81 milliGRAM(s) Oral daily  atorvastatin 40 milliGRAM(s) Oral at bedtime  benzonatate 100 milliGRAM(s) Oral three times a day  dextrose 5%. 1000 milliLiter(s) (50 mL/Hr) IV Continuous <Continuous>  dextrose 50% Injectable 12.5 Gram(s) IV Push once  dextrose 50% Injectable 25 Gram(s) IV Push once  dextrose 50% Injectable 25 Gram(s) IV Push once  enoxaparin Injectable 40 milliGRAM(s) SubCutaneous daily  folic acid 1 milliGRAM(s) Oral daily  hydrocodone/homatropine Syrup 5 milliLiter(s) Oral two times a day  hydroxychloroquine   Oral   hydroxychloroquine 200 milliGRAM(s) Oral every 12 hours  insulin lispro (HumaLOG) corrective regimen sliding scale   SubCutaneous three times a day before meals  insulin lispro (HumaLOG) corrective regimen sliding scale   SubCutaneous at bedtime  metoprolol succinate ER 25 milliGRAM(s) Oral daily  montelukast 10 milliGRAM(s) Oral daily  tamsulosin 0.4 milliGRAM(s) Oral at bedtime  zinc sulfate 220 milliGRAM(s) Oral daily    MEDICATIONS  (PRN):  acetaminophen   Tablet .. 650 milliGRAM(s) Oral every 4 hours PRN Temp greater or equal to 38.5C (101.3F)  ALBUTerol    90 MICROgram(s) HFA Inhaler 2 Puff(s) Inhalation every 4 hours PRN Shortness of Breath and/or Wheezing  dextrose 40% Gel 15 Gram(s) Oral once PRN Blood Glucose LESS THAN 70 milliGRAM(s)/deciliter  glucagon  Injectable 1 milliGRAM(s) IntraMuscular once PRN Glucose LESS THAN 70 milligrams/deciliter  guaiFENesin   Syrup  (Sugar-Free) 100 milliGRAM(s) Oral every 6 hours PRN Cough      Vital Signs Last 24 Hrs  T(C): 36.8 (02 Apr 2020 05:30), Max: 38.9 (02 Apr 2020 00:23)  T(F): 98.2 (02 Apr 2020 05:30), Max: 102.1 (02 Apr 2020 00:23)  HR: 98 (02 Apr 2020 05:30) (83 - 103)  BP: 128/85 (02 Apr 2020 05:30) (103/67 - 133/83)  BP(mean): --  RR: 20 (02 Apr 2020 05:30) (19 - 20)  SpO2: 92% (02 Apr 2020 05:30) (92% - 96%)  CAPILLARY BLOOD GLUCOSE      POCT Blood Glucose.: 112 mg/dL (01 Apr 2020 17:17)  POCT Blood Glucose.: 139 mg/dL (01 Apr 2020 11:27)  POCT Blood Glucose.: 137 mg/dL (01 Apr 2020 07:35)    I&O's Summary    31 Mar 2020 07:01  -  01 Apr 2020 07:00  --------------------------------------------------------  IN: 780 mL / OUT: 1350 mL / NET: -570 mL    01 Apr 2020 07:01  -  02 Apr 2020 06:52  --------------------------------------------------------  IN: 600 mL / OUT: 825 mL / NET: -225 mL      PHYSICAL EXAM:  GENERAL: NAD, well-developed  HEAD:  Atraumatic, Normocephalic  EYES: EOMI, PERRLA, conjunctiva and sclera clear  NECK: Supple,  CHEST/LUNG: Mild expiratory wheezing at the left lower lobe  HEART: Regular rate and rhythm; No murmurs, rubs, or gallops  ABDOMEN: Soft, Nontender, Nondistended; Bowel sounds present  EXTREMITIES:  2+ Peripheral Pulses, No clubbing, cyanosis, or edema  PSYCH: AAOx3  NEUROLOGY: non-focal  SKIN: No rashes or lesions    LABS:                        14.9   6.32  )-----------( 154      ( 01 Apr 2020 07:38 )             44.7     04-01    132<L>  |  93<L>  |  17  ----------------------------<  144<H>  4.2   |  27  |  1.24    Ca    8.5      01 Apr 2020 07:32    TPro  6.9  /  Alb  3.3  /  TBili  0.6  /  DBili  x   /  AST  31  /  ALT  33  /  AlkPhos  69  04-01      CARDIAC MARKERS ( 31 Mar 2020 13:55 )  x     / x     / 126 U/L / x     / <1.0 ng/mL        RADIOLOGY & ADDITIONAL TESTS:    Imaging Personally Reviewed:    Consultant(s) Notes Reviewed:      Care Discussed with Consultants/Other Providers: Patient is a 59y old  Male who presents with a chief complaint of cough, SOB (01 Apr 2020 06:55)      SUBJECTIVE / OVERNIGHT EVENTS:    Pt feeling unwell this morning, reports shortness of breath, worse yesterday but improved today, scared to go home. Report of fever up to 102 yesterday, otherwise denies fevers, chills, chest p/p, sob, n/v/d.     MEDICATIONS  (STANDING):  ascorbic acid 1000 milliGRAM(s) Oral daily  aspirin enteric coated 81 milliGRAM(s) Oral daily  atorvastatin 40 milliGRAM(s) Oral at bedtime  benzonatate 100 milliGRAM(s) Oral three times a day  dextrose 5%. 1000 milliLiter(s) (50 mL/Hr) IV Continuous <Continuous>  dextrose 50% Injectable 12.5 Gram(s) IV Push once  dextrose 50% Injectable 25 Gram(s) IV Push once  dextrose 50% Injectable 25 Gram(s) IV Push once  enoxaparin Injectable 40 milliGRAM(s) SubCutaneous daily  folic acid 1 milliGRAM(s) Oral daily  hydrocodone/homatropine Syrup 5 milliLiter(s) Oral two times a day  hydroxychloroquine   Oral   hydroxychloroquine 200 milliGRAM(s) Oral every 12 hours  insulin lispro (HumaLOG) corrective regimen sliding scale   SubCutaneous three times a day before meals  insulin lispro (HumaLOG) corrective regimen sliding scale   SubCutaneous at bedtime  metoprolol succinate ER 25 milliGRAM(s) Oral daily  montelukast 10 milliGRAM(s) Oral daily  tamsulosin 0.4 milliGRAM(s) Oral at bedtime  zinc sulfate 220 milliGRAM(s) Oral daily    MEDICATIONS  (PRN):  acetaminophen   Tablet .. 650 milliGRAM(s) Oral every 4 hours PRN Temp greater or equal to 38.5C (101.3F)  ALBUTerol    90 MICROgram(s) HFA Inhaler 2 Puff(s) Inhalation every 4 hours PRN Shortness of Breath and/or Wheezing  dextrose 40% Gel 15 Gram(s) Oral once PRN Blood Glucose LESS THAN 70 milliGRAM(s)/deciliter  glucagon  Injectable 1 milliGRAM(s) IntraMuscular once PRN Glucose LESS THAN 70 milligrams/deciliter  guaiFENesin   Syrup  (Sugar-Free) 100 milliGRAM(s) Oral every 6 hours PRN Cough      Vital Signs Last 24 Hrs  T(C): 36.8 (02 Apr 2020 05:30), Max: 38.9 (02 Apr 2020 00:23)  T(F): 98.2 (02 Apr 2020 05:30), Max: 102.1 (02 Apr 2020 00:23)  HR: 98 (02 Apr 2020 05:30) (83 - 103)  BP: 128/85 (02 Apr 2020 05:30) (103/67 - 133/83)  BP(mean): --  RR: 20 (02 Apr 2020 05:30) (19 - 20)  SpO2: 92% (02 Apr 2020 05:30) (92% - 96%)  CAPILLARY BLOOD GLUCOSE      POCT Blood Glucose.: 112 mg/dL (01 Apr 2020 17:17)  POCT Blood Glucose.: 139 mg/dL (01 Apr 2020 11:27)  POCT Blood Glucose.: 137 mg/dL (01 Apr 2020 07:35)    I&O's Summary    31 Mar 2020 07:01  -  01 Apr 2020 07:00  --------------------------------------------------------  IN: 780 mL / OUT: 1350 mL / NET: -570 mL    01 Apr 2020 07:01  -  02 Apr 2020 06:52  --------------------------------------------------------  IN: 600 mL / OUT: 825 mL / NET: -225 mL      PHYSICAL EXAM:  GENERAL: NAD, well-developed  HEAD:  Atraumatic, Normocephalic  EYES: EOMI, PERRLA, conjunctiva and sclera clear  NECK: Supple,  CHEST/LUNG: Clear  HEART: Regular rate and rhythm; No murmurs, rubs, or gallops  ABDOMEN: Soft, Nontender, Nondistended; Bowel sounds present  EXTREMITIES:  2+ Peripheral Pulses, No clubbing, cyanosis, or edema  PSYCH: AAOx3  NEUROLOGY: non-focal  SKIN: No rashes or lesions    LABS:                        14.9   6.32  )-----------( 154      ( 01 Apr 2020 07:38 )             44.7     04-01    132<L>  |  93<L>  |  17  ----------------------------<  144<H>  4.2   |  27  |  1.24    Ca    8.5      01 Apr 2020 07:32    TPro  6.9  /  Alb  3.3  /  TBili  0.6  /  DBili  x   /  AST  31  /  ALT  33  /  AlkPhos  69  04-01      CARDIAC MARKERS ( 31 Mar 2020 13:55 )  x     / x     / 126 U/L / x     / <1.0 ng/mL        RADIOLOGY & ADDITIONAL TESTS:    Imaging Personally Reviewed:    Consultant(s) Notes Reviewed:      Care Discussed with Consultants/Other Providers:

## 2020-04-02 NOTE — PROGRESS NOTE ADULT - PROBLEM SELECTOR PLAN 4
stents 3 years ago, c/w aspirin and statin  - chest pain only from coughing. Non ischemic EKG. Trops wnl.

## 2020-04-03 LAB
CRP SERPL-MCNC: 8.1 MG/DL — HIGH (ref 0–0.4)
FERRITIN SERPL-MCNC: 514 NG/ML — HIGH (ref 30–400)
GLUCOSE BLDC GLUCOMTR-MCNC: 149 MG/DL — HIGH (ref 70–99)
GLUCOSE BLDC GLUCOMTR-MCNC: 151 MG/DL — HIGH (ref 70–99)
GLUCOSE BLDC GLUCOMTR-MCNC: 151 MG/DL — HIGH (ref 70–99)
GLUCOSE BLDC GLUCOMTR-MCNC: 166 MG/DL — HIGH (ref 70–99)
LDH SERPL L TO P-CCNC: 456 U/L — HIGH (ref 50–242)

## 2020-04-03 PROCEDURE — 99233 SBSQ HOSP IP/OBS HIGH 50: CPT | Mod: GC

## 2020-04-03 RX ADMIN — TAMSULOSIN HYDROCHLORIDE 0.4 MILLIGRAM(S): 0.4 CAPSULE ORAL at 20:57

## 2020-04-03 RX ADMIN — MONTELUKAST 10 MILLIGRAM(S): 4 TABLET, CHEWABLE ORAL at 11:45

## 2020-04-03 RX ADMIN — Medication 100 MILLIGRAM(S): at 01:40

## 2020-04-03 RX ADMIN — Medication 1 MILLIGRAM(S): at 11:44

## 2020-04-03 RX ADMIN — Medication 1: at 09:23

## 2020-04-03 RX ADMIN — Medication 1000 MILLIGRAM(S): at 11:44

## 2020-04-03 RX ADMIN — Medication 100 MILLIGRAM(S): at 04:54

## 2020-04-03 RX ADMIN — Medication 200 MILLIGRAM(S): at 04:53

## 2020-04-03 RX ADMIN — Medication 100 MILLIGRAM(S): at 17:00

## 2020-04-03 RX ADMIN — Medication 650 MILLIGRAM(S): at 05:23

## 2020-04-03 RX ADMIN — ATORVASTATIN CALCIUM 40 MILLIGRAM(S): 80 TABLET, FILM COATED ORAL at 20:57

## 2020-04-03 RX ADMIN — Medication 1: at 11:45

## 2020-04-03 RX ADMIN — Medication 100 MILLIGRAM(S): at 20:57

## 2020-04-03 RX ADMIN — Medication 81 MILLIGRAM(S): at 11:44

## 2020-04-03 RX ADMIN — Medication 100 MILLIGRAM(S): at 12:50

## 2020-04-03 RX ADMIN — Medication 200 MILLIGRAM(S): at 16:42

## 2020-04-03 RX ADMIN — ZINC SULFATE TAB 220 MG (50 MG ZINC EQUIVALENT) 220 MILLIGRAM(S): 220 (50 ZN) TAB at 11:45

## 2020-04-03 RX ADMIN — Medication 25 MILLIGRAM(S): at 04:54

## 2020-04-03 RX ADMIN — ENOXAPARIN SODIUM 40 MILLIGRAM(S): 100 INJECTION SUBCUTANEOUS at 11:44

## 2020-04-03 NOTE — PROVIDER CONTACT NOTE (OTHER) - ACTION/TREATMENT ORDERED:
md aware will come to assess pt
Pt was given Hycodan and Robitussin     will continue to monitor pt
MD aware. STAT EKG, cardiac enzymes and lactate. IV Tylenol ordered to be admin as ordered. Will recheck in approx 60mins. Will continue to monitor

## 2020-04-03 NOTE — PROGRESS NOTE ADULT - SUBJECTIVE AND OBJECTIVE BOX
Patient is a 59y old  Male who presents with a chief complaint of cough, SOB (02 Apr 2020 06:52)      SUBJECTIVE / OVERNIGHT EVENTS:    MEDICATIONS  (STANDING):  ascorbic acid 1000 milliGRAM(s) Oral daily  aspirin enteric coated 81 milliGRAM(s) Oral daily  atorvastatin 40 milliGRAM(s) Oral at bedtime  benzonatate 100 milliGRAM(s) Oral three times a day  dextrose 5%. 1000 milliLiter(s) (50 mL/Hr) IV Continuous <Continuous>  dextrose 50% Injectable 12.5 Gram(s) IV Push once  dextrose 50% Injectable 25 Gram(s) IV Push once  dextrose 50% Injectable 25 Gram(s) IV Push once  enoxaparin Injectable 40 milliGRAM(s) SubCutaneous daily  folic acid 1 milliGRAM(s) Oral daily  hydrocodone/homatropine Syrup 5 milliLiter(s) Oral two times a day  hydroxychloroquine   Oral   hydroxychloroquine 200 milliGRAM(s) Oral every 12 hours  insulin lispro (HumaLOG) corrective regimen sliding scale   SubCutaneous three times a day before meals  insulin lispro (HumaLOG) corrective regimen sliding scale   SubCutaneous at bedtime  metoprolol succinate ER 25 milliGRAM(s) Oral daily  montelukast 10 milliGRAM(s) Oral daily  tamsulosin 0.4 milliGRAM(s) Oral at bedtime  zinc sulfate 220 milliGRAM(s) Oral daily    MEDICATIONS  (PRN):  acetaminophen   Tablet .. 650 milliGRAM(s) Oral every 4 hours PRN Temp greater or equal to 38.5C (101.3F)  ALBUTerol    90 MICROgram(s) HFA Inhaler 2 Puff(s) Inhalation every 4 hours PRN Shortness of Breath and/or Wheezing  dextrose 40% Gel 15 Gram(s) Oral once PRN Blood Glucose LESS THAN 70 milliGRAM(s)/deciliter  glucagon  Injectable 1 milliGRAM(s) IntraMuscular once PRN Glucose LESS THAN 70 milligrams/deciliter  guaiFENesin   Syrup  (Sugar-Free) 100 milliGRAM(s) Oral every 6 hours PRN Cough      Vital Signs Last 24 Hrs  T(C): 37.8 (03 Apr 2020 05:29), Max: 38.7 (02 Apr 2020 17:35)  T(F): 100 (03 Apr 2020 05:29), Max: 101.7 (02 Apr 2020 17:35)  HR: 93 (03 Apr 2020 05:29) (71 - 99)  BP: 121/75 (03 Apr 2020 05:29) (108/66 - 121/75)  BP(mean): --  RR: 20 (03 Apr 2020 05:29) (20 - 20)  SpO2: 94% (03 Apr 2020 05:29) (89% - 95%)  CAPILLARY BLOOD GLUCOSE      POCT Blood Glucose.: 126 mg/dL (02 Apr 2020 21:56)  POCT Blood Glucose.: 140 mg/dL (02 Apr 2020 16:47)  POCT Blood Glucose.: 150 mg/dL (02 Apr 2020 10:52)    I&O's Summary    01 Apr 2020 07:01  -  02 Apr 2020 07:00  --------------------------------------------------------  IN: 600 mL / OUT: 825 mL / NET: -225 mL    02 Apr 2020 07:01  -  03 Apr 2020 06:50  --------------------------------------------------------  IN: 780 mL / OUT: 1330 mL / NET: -550 mL      PHYSICAL EXAM:  GENERAL: NAD, well-developed  HEAD:  Atraumatic, Normocephalic  EYES: EOMI, PERRLA, conjunctiva and sclera clear  NECK: Supple,  CHEST/LUNG: Clear  HEART: Regular rate and rhythm; No murmurs, rubs, or gallops  ABDOMEN: Soft, Nontender, Nondistended; Bowel sounds present  EXTREMITIES:  2+ Peripheral Pulses, No clubbing, cyanosis, or edema  PSYCH: AAOx3  NEUROLOGY: non-focal  SKIN: No rashes or lesions    LABS:                        15.0   7.50  )-----------( 152      ( 02 Apr 2020 06:33 )             43.5     04-02    134<L>  |  94<L>  |  14  ----------------------------<  150<H>  3.7   |  26  |  1.08    Ca    8.4      02 Apr 2020 06:37    TPro  7.1  /  Alb  3.6  /  TBili  0.7  /  DBili  x   /  AST  36  /  ALT  32  /  AlkPhos  67  04-02    RADIOLOGY & ADDITIONAL TESTS:    Imaging Personally Reviewed:    Consultant(s) Notes Reviewed:      Care Discussed with Consultants/Other Providers: Patient is a 59y old  Male who presents with a chief complaint of cough, SOB (02 Apr 2020 06:52)      SUBJECTIVE / OVERNIGHT EVENTS:    Tolerating well on 6L NC this morning. Appeared comfortable, reports improved from yesterday.  Reported episode of fever up to 87.7 yesterday afternnon, but none overnight. Otherwise denies chills, chest p/p, n/v/d.     MEDICATIONS  (STANDING):  ascorbic acid 1000 milliGRAM(s) Oral daily  aspirin enteric coated 81 milliGRAM(s) Oral daily  atorvastatin 40 milliGRAM(s) Oral at bedtime  benzonatate 100 milliGRAM(s) Oral three times a day  dextrose 5%. 1000 milliLiter(s) (50 mL/Hr) IV Continuous <Continuous>  dextrose 50% Injectable 12.5 Gram(s) IV Push once  dextrose 50% Injectable 25 Gram(s) IV Push once  dextrose 50% Injectable 25 Gram(s) IV Push once  enoxaparin Injectable 40 milliGRAM(s) SubCutaneous daily  folic acid 1 milliGRAM(s) Oral daily  hydrocodone/homatropine Syrup 5 milliLiter(s) Oral two times a day  hydroxychloroquine   Oral   hydroxychloroquine 200 milliGRAM(s) Oral every 12 hours  insulin lispro (HumaLOG) corrective regimen sliding scale   SubCutaneous three times a day before meals  insulin lispro (HumaLOG) corrective regimen sliding scale   SubCutaneous at bedtime  metoprolol succinate ER 25 milliGRAM(s) Oral daily  montelukast 10 milliGRAM(s) Oral daily  tamsulosin 0.4 milliGRAM(s) Oral at bedtime  zinc sulfate 220 milliGRAM(s) Oral daily    MEDICATIONS  (PRN):  acetaminophen   Tablet .. 650 milliGRAM(s) Oral every 4 hours PRN Temp greater or equal to 38.5C (101.3F)  ALBUTerol    90 MICROgram(s) HFA Inhaler 2 Puff(s) Inhalation every 4 hours PRN Shortness of Breath and/or Wheezing  dextrose 40% Gel 15 Gram(s) Oral once PRN Blood Glucose LESS THAN 70 milliGRAM(s)/deciliter  glucagon  Injectable 1 milliGRAM(s) IntraMuscular once PRN Glucose LESS THAN 70 milligrams/deciliter  guaiFENesin   Syrup  (Sugar-Free) 100 milliGRAM(s) Oral every 6 hours PRN Cough      Vital Signs Last 24 Hrs  T(C): 37.8 (03 Apr 2020 05:29), Max: 38.7 (02 Apr 2020 17:35)  T(F): 100 (03 Apr 2020 05:29), Max: 101.7 (02 Apr 2020 17:35)  HR: 93 (03 Apr 2020 05:29) (71 - 99)  BP: 121/75 (03 Apr 2020 05:29) (108/66 - 121/75)  BP(mean): --  RR: 20 (03 Apr 2020 05:29) (20 - 20)  SpO2: 94% (03 Apr 2020 05:29) (89% - 95%)  CAPILLARY BLOOD GLUCOSE      POCT Blood Glucose.: 126 mg/dL (02 Apr 2020 21:56)  POCT Blood Glucose.: 140 mg/dL (02 Apr 2020 16:47)  POCT Blood Glucose.: 150 mg/dL (02 Apr 2020 10:52)    I&O's Summary    01 Apr 2020 07:01  -  02 Apr 2020 07:00  --------------------------------------------------------  IN: 600 mL / OUT: 825 mL / NET: -225 mL    02 Apr 2020 07:01  -  03 Apr 2020 06:50  --------------------------------------------------------  IN: 780 mL / OUT: 1330 mL / NET: -550 mL      PHYSICAL EXAM:  GENERAL: NAD, well-developed  HEAD:  Atraumatic, Normocephalic  EYES: EOMI, PERRLA, conjunctiva and sclera clear  NECK: Supple,  CHEST/LUNG: Clear  HEART: Regular rate and rhythm; No murmurs, rubs, or gallops  ABDOMEN: Soft, Nontender, Nondistended; Bowel sounds present  EXTREMITIES:  2+ Peripheral Pulses, No clubbing, cyanosis, or edema  PSYCH: AAOx3  NEUROLOGY: non-focal  SKIN: No rashes or lesions    LABS:                        15.0   7.50  )-----------( 152      ( 02 Apr 2020 06:33 )             43.5     04-02    134<L>  |  94<L>  |  14  ----------------------------<  150<H>  3.7   |  26  |  1.08    Ca    8.4      02 Apr 2020 06:37    TPro  7.1  /  Alb  3.6  /  TBili  0.7  /  DBili  x   /  AST  36  /  ALT  32  /  AlkPhos  67  04-02    RADIOLOGY & ADDITIONAL TESTS:    Imaging Personally Reviewed:    Consultant(s) Notes Reviewed:      Care Discussed with Consultants/Other Providers: Patient is a 59y old  Male who presents with a chief complaint of cough, SOB (02 Apr 2020 06:52)      SUBJECTIVE / OVERNIGHT EVENTS:    Tolerating well on 6L NC this morning. Appeared comfortable, reports improved from yesterday.  Reported episode of fever up to 37.7 yesterday afternnon, but none overnight. Otherwise denies chills, chest p/p, n/v/d.     MEDICATIONS  (STANDING):  ascorbic acid 1000 milliGRAM(s) Oral daily  aspirin enteric coated 81 milliGRAM(s) Oral daily  atorvastatin 40 milliGRAM(s) Oral at bedtime  benzonatate 100 milliGRAM(s) Oral three times a day  dextrose 5%. 1000 milliLiter(s) (50 mL/Hr) IV Continuous <Continuous>  dextrose 50% Injectable 12.5 Gram(s) IV Push once  dextrose 50% Injectable 25 Gram(s) IV Push once  dextrose 50% Injectable 25 Gram(s) IV Push once  enoxaparin Injectable 40 milliGRAM(s) SubCutaneous daily  folic acid 1 milliGRAM(s) Oral daily  hydrocodone/homatropine Syrup 5 milliLiter(s) Oral two times a day  hydroxychloroquine   Oral   hydroxychloroquine 200 milliGRAM(s) Oral every 12 hours  insulin lispro (HumaLOG) corrective regimen sliding scale   SubCutaneous three times a day before meals  insulin lispro (HumaLOG) corrective regimen sliding scale   SubCutaneous at bedtime  metoprolol succinate ER 25 milliGRAM(s) Oral daily  montelukast 10 milliGRAM(s) Oral daily  tamsulosin 0.4 milliGRAM(s) Oral at bedtime  zinc sulfate 220 milliGRAM(s) Oral daily    MEDICATIONS  (PRN):  acetaminophen   Tablet .. 650 milliGRAM(s) Oral every 4 hours PRN Temp greater or equal to 38.5C (101.3F)  ALBUTerol    90 MICROgram(s) HFA Inhaler 2 Puff(s) Inhalation every 4 hours PRN Shortness of Breath and/or Wheezing  dextrose 40% Gel 15 Gram(s) Oral once PRN Blood Glucose LESS THAN 70 milliGRAM(s)/deciliter  glucagon  Injectable 1 milliGRAM(s) IntraMuscular once PRN Glucose LESS THAN 70 milligrams/deciliter  guaiFENesin   Syrup  (Sugar-Free) 100 milliGRAM(s) Oral every 6 hours PRN Cough      Vital Signs Last 24 Hrs  T(C): 37.8 (03 Apr 2020 05:29), Max: 38.7 (02 Apr 2020 17:35)  T(F): 100 (03 Apr 2020 05:29), Max: 101.7 (02 Apr 2020 17:35)  HR: 93 (03 Apr 2020 05:29) (71 - 99)  BP: 121/75 (03 Apr 2020 05:29) (108/66 - 121/75)  BP(mean): --  RR: 20 (03 Apr 2020 05:29) (20 - 20)  SpO2: 94% (03 Apr 2020 05:29) (89% - 95%)  CAPILLARY BLOOD GLUCOSE      POCT Blood Glucose.: 126 mg/dL (02 Apr 2020 21:56)  POCT Blood Glucose.: 140 mg/dL (02 Apr 2020 16:47)  POCT Blood Glucose.: 150 mg/dL (02 Apr 2020 10:52)    I&O's Summary    01 Apr 2020 07:01  -  02 Apr 2020 07:00  --------------------------------------------------------  IN: 600 mL / OUT: 825 mL / NET: -225 mL    02 Apr 2020 07:01  -  03 Apr 2020 06:50  --------------------------------------------------------  IN: 780 mL / OUT: 1330 mL / NET: -550 mL      PHYSICAL EXAM:  GENERAL: NAD, well-developed  HEAD:  Atraumatic, Normocephalic  EYES: EOMI, PERRLA, conjunctiva and sclera clear  NECK: Supple,  CHEST/LUNG: Clear  HEART: Regular rate and rhythm; No murmurs, rubs, or gallops  ABDOMEN: Soft, Nontender, Nondistended; Bowel sounds present  EXTREMITIES:  2+ Peripheral Pulses, No clubbing, cyanosis, or edema  PSYCH: AAOx3  NEUROLOGY: non-focal  SKIN: No rashes or lesions    LABS:                        15.0   7.50  )-----------( 152      ( 02 Apr 2020 06:33 )             43.5     04-02    134<L>  |  94<L>  |  14  ----------------------------<  150<H>  3.7   |  26  |  1.08    Ca    8.4      02 Apr 2020 06:37    TPro  7.1  /  Alb  3.6  /  TBili  0.7  /  DBili  x   /  AST  36  /  ALT  32  /  AlkPhos  67  04-02    RADIOLOGY & ADDITIONAL TESTS:    Imaging Personally Reviewed:    Consultant(s) Notes Reviewed:      Care Discussed with Consultants/Other Providers:

## 2020-04-03 NOTE — PROVIDER CONTACT NOTE (OTHER) - SITUATION
pt reports chest pain, "10/10", radiating "across chest" states this has happened before when he "first came in"
Patient resting bed on room air, however suddenly c/o chest pain and has become more tachypneic and tachycardic, requires NC supplementation
positive covid

## 2020-04-03 NOTE — PROGRESS NOTE ADULT - PROBLEM SELECTOR PLAN 6
glucose initially 300s on BMP, . Suspect has overt T2DM. CC diet. C/w HISS.  - will start bedtime lantus 10units for now

## 2020-04-03 NOTE — PROGRESS NOTE ADULT - PROBLEM SELECTOR PLAN 1
2/2 to COVID infection  - currently on 5L NC but titrate up as per COVID protocol, solumedrol 1mg/kg if on nonrebreather,  - symptomatic treatment w/ albuterol inhaler, tessalon, robitussin and hycadin  - hydroxychloroquine load and maintenance 2/2 to COVID infection  - currently on 6L NC but titrate up as per COVID protocol, solumedrol 1mg/kg if on nonrebreather,  - symptomatic treatment w/ albuterol inhaler, tessalon, robitussin and hycadin  - hydroxychloroquine load and maintenance

## 2020-04-04 LAB
ALBUMIN SERPL ELPH-MCNC: 3.2 G/DL — LOW (ref 3.3–5)
ALP SERPL-CCNC: 60 U/L — SIGNIFICANT CHANGE UP (ref 40–120)
ALT FLD-CCNC: 36 U/L — SIGNIFICANT CHANGE UP (ref 10–45)
ANION GAP SERPL CALC-SCNC: 15 MMOL/L — SIGNIFICANT CHANGE UP (ref 5–17)
AST SERPL-CCNC: 49 U/L — HIGH (ref 10–40)
BILIRUB SERPL-MCNC: 0.6 MG/DL — SIGNIFICANT CHANGE UP (ref 0.2–1.2)
BUN SERPL-MCNC: 14 MG/DL — SIGNIFICANT CHANGE UP (ref 7–23)
CALCIUM SERPL-MCNC: 8.5 MG/DL — SIGNIFICANT CHANGE UP (ref 8.4–10.5)
CHLORIDE SERPL-SCNC: 94 MMOL/L — LOW (ref 96–108)
CO2 SERPL-SCNC: 25 MMOL/L — SIGNIFICANT CHANGE UP (ref 22–31)
CREAT SERPL-MCNC: 0.96 MG/DL — SIGNIFICANT CHANGE UP (ref 0.5–1.3)
CRP SERPL-MCNC: 9.5 MG/DL — HIGH (ref 0–0.4)
FERRITIN SERPL-MCNC: 539 NG/ML — HIGH (ref 30–400)
GLUCOSE BLDC GLUCOMTR-MCNC: 119 MG/DL — HIGH (ref 70–99)
GLUCOSE BLDC GLUCOMTR-MCNC: 129 MG/DL — HIGH (ref 70–99)
GLUCOSE BLDC GLUCOMTR-MCNC: 178 MG/DL — HIGH (ref 70–99)
GLUCOSE BLDC GLUCOMTR-MCNC: 277 MG/DL — HIGH (ref 70–99)
GLUCOSE SERPL-MCNC: 118 MG/DL — HIGH (ref 70–99)
HCT VFR BLD CALC: 40 % — SIGNIFICANT CHANGE UP (ref 39–50)
HGB BLD-MCNC: 13.1 G/DL — SIGNIFICANT CHANGE UP (ref 13–17)
MAGNESIUM SERPL-MCNC: 2.7 MG/DL — HIGH (ref 1.6–2.6)
MCHC RBC-ENTMCNC: 28.6 PG — SIGNIFICANT CHANGE UP (ref 27–34)
MCHC RBC-ENTMCNC: 32.8 GM/DL — SIGNIFICANT CHANGE UP (ref 32–36)
MCV RBC AUTO: 87.3 FL — SIGNIFICANT CHANGE UP (ref 80–100)
NRBC # BLD: 0 /100 WBCS — SIGNIFICANT CHANGE UP (ref 0–0)
PHOSPHATE SERPL-MCNC: 3.2 MG/DL — SIGNIFICANT CHANGE UP (ref 2.5–4.5)
PLATELET # BLD AUTO: 176 K/UL — SIGNIFICANT CHANGE UP (ref 150–400)
POTASSIUM SERPL-MCNC: 3.8 MMOL/L — SIGNIFICANT CHANGE UP (ref 3.5–5.3)
POTASSIUM SERPL-SCNC: 3.8 MMOL/L — SIGNIFICANT CHANGE UP (ref 3.5–5.3)
PROT SERPL-MCNC: 6.7 G/DL — SIGNIFICANT CHANGE UP (ref 6–8.3)
RBC # BLD: 4.58 M/UL — SIGNIFICANT CHANGE UP (ref 4.2–5.8)
RBC # FLD: 11.9 % — SIGNIFICANT CHANGE UP (ref 10.3–14.5)
SODIUM SERPL-SCNC: 134 MMOL/L — LOW (ref 135–145)
WBC # BLD: 10.87 K/UL — HIGH (ref 3.8–10.5)
WBC # FLD AUTO: 10.87 K/UL — HIGH (ref 3.8–10.5)

## 2020-04-04 PROCEDURE — 99233 SBSQ HOSP IP/OBS HIGH 50: CPT | Mod: GC

## 2020-04-04 RX ADMIN — Medication 100 MILLIGRAM(S): at 13:36

## 2020-04-04 RX ADMIN — MONTELUKAST 10 MILLIGRAM(S): 4 TABLET, CHEWABLE ORAL at 13:36

## 2020-04-04 RX ADMIN — Medication 81 MILLIGRAM(S): at 13:35

## 2020-04-04 RX ADMIN — Medication 100 MILLIGRAM(S): at 05:30

## 2020-04-04 RX ADMIN — Medication 100 MILLIGRAM(S): at 09:27

## 2020-04-04 RX ADMIN — Medication 1 MILLIGRAM(S): at 13:36

## 2020-04-04 RX ADMIN — ENOXAPARIN SODIUM 40 MILLIGRAM(S): 100 INJECTION SUBCUTANEOUS at 13:36

## 2020-04-04 RX ADMIN — Medication 100 MILLIGRAM(S): at 05:29

## 2020-04-04 RX ADMIN — Medication 200 MILLIGRAM(S): at 21:19

## 2020-04-04 RX ADMIN — Medication 3: at 17:04

## 2020-04-04 RX ADMIN — Medication 25 MILLIGRAM(S): at 05:29

## 2020-04-04 RX ADMIN — Medication 100 MILLIGRAM(S): at 17:04

## 2020-04-04 RX ADMIN — Medication 100 MILLIGRAM(S): at 02:30

## 2020-04-04 RX ADMIN — Medication 100 MILLIGRAM(S): at 21:19

## 2020-04-04 RX ADMIN — TAMSULOSIN HYDROCHLORIDE 0.4 MILLIGRAM(S): 0.4 CAPSULE ORAL at 21:20

## 2020-04-04 RX ADMIN — ATORVASTATIN CALCIUM 40 MILLIGRAM(S): 80 TABLET, FILM COATED ORAL at 21:19

## 2020-04-04 NOTE — PROGRESS NOTE ADULT - PROBLEM SELECTOR PLAN 3
with bandemia to 7%, s/p CTX and zithro x 1 in ED as CXR infiltrate unilateral; however, since COVID+, less likely bacterial PNA.  - avoid IVF to prevent ARDS  - hold abx at this time

## 2020-04-04 NOTE — PROGRESS NOTE ADULT - SUBJECTIVE AND OBJECTIVE BOX
Patient is a 59y old  Male who presents with a chief complaint of cough, SOB (03 Apr 2020 06:50)      SUBJECTIVE / OVERNIGHT EVENTS:    MEDICATIONS  (STANDING):  aspirin enteric coated 81 milliGRAM(s) Oral daily  atorvastatin 40 milliGRAM(s) Oral at bedtime  benzonatate 100 milliGRAM(s) Oral three times a day  dextrose 5%. 1000 milliLiter(s) (50 mL/Hr) IV Continuous <Continuous>  dextrose 50% Injectable 12.5 Gram(s) IV Push once  dextrose 50% Injectable 25 Gram(s) IV Push once  dextrose 50% Injectable 25 Gram(s) IV Push once  enoxaparin Injectable 40 milliGRAM(s) SubCutaneous daily  folic acid 1 milliGRAM(s) Oral daily  guaiFENesin   Syrup  (Sugar-Free) 100 milliGRAM(s) Oral every 4 hours  hydrocodone/homatropine Syrup 5 milliLiter(s) Oral two times a day  insulin lispro (HumaLOG) corrective regimen sliding scale   SubCutaneous three times a day before meals  insulin lispro (HumaLOG) corrective regimen sliding scale   SubCutaneous at bedtime  metoprolol succinate ER 25 milliGRAM(s) Oral daily  montelukast 10 milliGRAM(s) Oral daily  tamsulosin 0.4 milliGRAM(s) Oral at bedtime    MEDICATIONS  (PRN):  acetaminophen   Tablet .. 650 milliGRAM(s) Oral every 4 hours PRN Temp greater or equal to 38.5C (101.3F)  ALBUTerol    90 MICROgram(s) HFA Inhaler 2 Puff(s) Inhalation every 4 hours PRN Shortness of Breath and/or Wheezing  dextrose 40% Gel 15 Gram(s) Oral once PRN Blood Glucose LESS THAN 70 milliGRAM(s)/deciliter  glucagon  Injectable 1 milliGRAM(s) IntraMuscular once PRN Glucose LESS THAN 70 milligrams/deciliter      Vital Signs Last 24 Hrs  T(C): 36.8 (04 Apr 2020 05:23), Max: 37.6 (03 Apr 2020 18:25)  T(F): 98.2 (04 Apr 2020 05:23), Max: 99.7 (03 Apr 2020 18:25)  HR: 60 (04 Apr 2020 05:23) (60 - 97)  BP: 121/77 (04 Apr 2020 05:23) (111/73 - 121/77)  BP(mean): --  RR: 20 (04 Apr 2020 05:23) (20 - 20)  SpO2: 96% (04 Apr 2020 05:23) (95% - 98%)  CAPILLARY BLOOD GLUCOSE      POCT Blood Glucose.: 151 mg/dL (03 Apr 2020 20:59)  POCT Blood Glucose.: 149 mg/dL (03 Apr 2020 16:25)  POCT Blood Glucose.: 151 mg/dL (03 Apr 2020 11:37)  POCT Blood Glucose.: 166 mg/dL (03 Apr 2020 09:19)    I&O's Summary    02 Apr 2020 07:01  -  03 Apr 2020 07:00  --------------------------------------------------------  IN: 780 mL / OUT: 1330 mL / NET: -550 mL    03 Apr 2020 07:01  -  04 Apr 2020 06:59  --------------------------------------------------------  IN: 560 mL / OUT: 1450 mL / NET: -890 mL      PHYSICAL EXAM:  GENERAL: NAD, well-developed  HEAD:  Atraumatic, Normocephalic  EYES: EOMI, PERRLA, conjunctiva and sclera clear  NECK: Supple,  CHEST/LUNG: coarse breath sounds at the bases  HEART: Regular rate and rhythm; No murmurs, rubs, or gallops  ABDOMEN: Soft, Nontender, Nondistended; Bowel sounds present  EXTREMITIES:  2+ Peripheral Pulses, No clubbing, cyanosis, or edema  PSYCH: AAOx3  NEUROLOGY: non-focal  SKIN: No rashes or lesions    LABS:    RADIOLOGY & ADDITIONAL TESTS:    Imaging Personally Reviewed:    Consultant(s) Notes Reviewed:      Care Discussed with Consultants/Other Providers: Patient is a 59y old  Male who presents with a chief complaint of cough, SOB (03 Apr 2020 06:50)      SUBJECTIVE / OVERNIGHT EVENTS:    Pt reports sleeping comfortably last night, was off of NC when he was sleeping. Spoke to pt at bedside, pt frustrated with the course of his disease, still having unresolving coughs. Otherwise no report of fevers, chills, chest p/p, sob, n/v/d.     MEDICATIONS  (STANDING):  aspirin enteric coated 81 milliGRAM(s) Oral daily  atorvastatin 40 milliGRAM(s) Oral at bedtime  benzonatate 100 milliGRAM(s) Oral three times a day  dextrose 5%. 1000 milliLiter(s) (50 mL/Hr) IV Continuous <Continuous>  dextrose 50% Injectable 12.5 Gram(s) IV Push once  dextrose 50% Injectable 25 Gram(s) IV Push once  dextrose 50% Injectable 25 Gram(s) IV Push once  enoxaparin Injectable 40 milliGRAM(s) SubCutaneous daily  folic acid 1 milliGRAM(s) Oral daily  guaiFENesin   Syrup  (Sugar-Free) 100 milliGRAM(s) Oral every 4 hours  hydrocodone/homatropine Syrup 5 milliLiter(s) Oral two times a day  insulin lispro (HumaLOG) corrective regimen sliding scale   SubCutaneous three times a day before meals  insulin lispro (HumaLOG) corrective regimen sliding scale   SubCutaneous at bedtime  metoprolol succinate ER 25 milliGRAM(s) Oral daily  montelukast 10 milliGRAM(s) Oral daily  tamsulosin 0.4 milliGRAM(s) Oral at bedtime    MEDICATIONS  (PRN):  acetaminophen   Tablet .. 650 milliGRAM(s) Oral every 4 hours PRN Temp greater or equal to 38.5C (101.3F)  ALBUTerol    90 MICROgram(s) HFA Inhaler 2 Puff(s) Inhalation every 4 hours PRN Shortness of Breath and/or Wheezing  dextrose 40% Gel 15 Gram(s) Oral once PRN Blood Glucose LESS THAN 70 milliGRAM(s)/deciliter  glucagon  Injectable 1 milliGRAM(s) IntraMuscular once PRN Glucose LESS THAN 70 milligrams/deciliter      Vital Signs Last 24 Hrs  T(C): 36.8 (04 Apr 2020 05:23), Max: 37.6 (03 Apr 2020 18:25)  T(F): 98.2 (04 Apr 2020 05:23), Max: 99.7 (03 Apr 2020 18:25)  HR: 60 (04 Apr 2020 05:23) (60 - 97)  BP: 121/77 (04 Apr 2020 05:23) (111/73 - 121/77)  BP(mean): --  RR: 20 (04 Apr 2020 05:23) (20 - 20)  SpO2: 96% (04 Apr 2020 05:23) (95% - 98%)  CAPILLARY BLOOD GLUCOSE      POCT Blood Glucose.: 151 mg/dL (03 Apr 2020 20:59)  POCT Blood Glucose.: 149 mg/dL (03 Apr 2020 16:25)  POCT Blood Glucose.: 151 mg/dL (03 Apr 2020 11:37)  POCT Blood Glucose.: 166 mg/dL (03 Apr 2020 09:19)    I&O's Summary    02 Apr 2020 07:01  -  03 Apr 2020 07:00  --------------------------------------------------------  IN: 780 mL / OUT: 1330 mL / NET: -550 mL    03 Apr 2020 07:01  -  04 Apr 2020 06:59  --------------------------------------------------------  IN: 560 mL / OUT: 1450 mL / NET: -890 mL      PHYSICAL EXAM:  GENERAL: NAD, well-developed  HEAD:  Atraumatic, Normocephalic  EYES: EOMI, PERRLA, conjunctiva and sclera clear  NECK: Supple,  CHEST/LUNG: coarse breath sounds at the bases  HEART: Regular rate and rhythm; No murmurs, rubs, or gallops  ABDOMEN: Soft, Nontender, Nondistended; Bowel sounds present  EXTREMITIES:  2+ Peripheral Pulses, No clubbing, cyanosis, or edema  PSYCH: AAOx3  NEUROLOGY: non-focal  SKIN: No rashes or lesions    LABS:    RADIOLOGY & ADDITIONAL TESTS:    Imaging Personally Reviewed:    Consultant(s) Notes Reviewed:      Care Discussed with Consultants/Other Providers:

## 2020-04-04 NOTE — PROGRESS NOTE ADULT - PROBLEM SELECTOR PLAN 8
Problem: Discharge planning issues. Plan; Transitions of Care Status:  1. Name of PCP:  2. PCP contacted on Admission: []Y []N  3. PCP contacted at Discharge: []Y []N  4. Post-Discharge Appointment Date and Location:   5. Summary of Handoff given to PCP: Problem: Discharge planning issues. Plan; Transitions of Care Status:  1. Name of PCP: Dr. Crowe  2. PCP contacted on Admission: [X]Y []N  3. PCP contacted at Discharge: []Y []N  4. Post-Discharge Appointment Date and Location:   5. Summary of Handoff given to PCP:

## 2020-04-04 NOTE — PROGRESS NOTE ADULT - PROBLEM SELECTOR PLAN 1
2/2 to COVID infection  - currently on 5L NC but titrate up as per COVID protocol, solumedrol 1mg/kg if on nonrebreather,  - symptomatic treatment w/ albuterol inhaler, tessalon, robitussin and hycadin  - hydroxychloroquine load and maintenance 2/2 to COVID infection  - currently on 3L NC but titrate up as per COVID protocol, solumedrol 1mg/kg if on nonrebreather,  - symptomatic treatment w/ albuterol inhaler, tessalon, robitussin and hycadin  - hydroxychloroquine load and maintenance

## 2020-04-05 LAB
ALBUMIN SERPL ELPH-MCNC: 2.9 G/DL — LOW (ref 3.3–5)
ALP SERPL-CCNC: 58 U/L — SIGNIFICANT CHANGE UP (ref 40–120)
ALT FLD-CCNC: 46 U/L — HIGH (ref 10–45)
ANION GAP SERPL CALC-SCNC: 11 MMOL/L — SIGNIFICANT CHANGE UP (ref 5–17)
AST SERPL-CCNC: 53 U/L — HIGH (ref 10–40)
BILIRUB SERPL-MCNC: 0.5 MG/DL — SIGNIFICANT CHANGE UP (ref 0.2–1.2)
BUN SERPL-MCNC: 12 MG/DL — SIGNIFICANT CHANGE UP (ref 7–23)
CALCIUM SERPL-MCNC: 8.1 MG/DL — LOW (ref 8.4–10.5)
CHLORIDE SERPL-SCNC: 99 MMOL/L — SIGNIFICANT CHANGE UP (ref 96–108)
CO2 SERPL-SCNC: 27 MMOL/L — SIGNIFICANT CHANGE UP (ref 22–31)
CREAT SERPL-MCNC: 0.87 MG/DL — SIGNIFICANT CHANGE UP (ref 0.5–1.3)
CRP SERPL-MCNC: 5.16 MG/DL — HIGH (ref 0–0.4)
FERRITIN SERPL-MCNC: 573 NG/ML — HIGH (ref 30–400)
GLUCOSE BLDC GLUCOMTR-MCNC: 128 MG/DL — HIGH (ref 70–99)
GLUCOSE BLDC GLUCOMTR-MCNC: 152 MG/DL — HIGH (ref 70–99)
GLUCOSE BLDC GLUCOMTR-MCNC: 153 MG/DL — HIGH (ref 70–99)
GLUCOSE BLDC GLUCOMTR-MCNC: 177 MG/DL — HIGH (ref 70–99)
GLUCOSE SERPL-MCNC: 122 MG/DL — HIGH (ref 70–99)
HCT VFR BLD CALC: 38.6 % — LOW (ref 39–50)
HGB BLD-MCNC: 12.6 G/DL — LOW (ref 13–17)
MAGNESIUM SERPL-MCNC: 2.6 MG/DL — SIGNIFICANT CHANGE UP (ref 1.6–2.6)
MCHC RBC-ENTMCNC: 28.5 PG — SIGNIFICANT CHANGE UP (ref 27–34)
MCHC RBC-ENTMCNC: 32.6 GM/DL — SIGNIFICANT CHANGE UP (ref 32–36)
MCV RBC AUTO: 87.3 FL — SIGNIFICANT CHANGE UP (ref 80–100)
NRBC # BLD: 0 /100 WBCS — SIGNIFICANT CHANGE UP (ref 0–0)
PHOSPHATE SERPL-MCNC: 3.1 MG/DL — SIGNIFICANT CHANGE UP (ref 2.5–4.5)
PLATELET # BLD AUTO: 211 K/UL — SIGNIFICANT CHANGE UP (ref 150–400)
POTASSIUM SERPL-MCNC: 3.9 MMOL/L — SIGNIFICANT CHANGE UP (ref 3.5–5.3)
POTASSIUM SERPL-SCNC: 3.9 MMOL/L — SIGNIFICANT CHANGE UP (ref 3.5–5.3)
PROT SERPL-MCNC: 6.1 G/DL — SIGNIFICANT CHANGE UP (ref 6–8.3)
RBC # BLD: 4.42 M/UL — SIGNIFICANT CHANGE UP (ref 4.2–5.8)
RBC # FLD: 11.9 % — SIGNIFICANT CHANGE UP (ref 10.3–14.5)
SODIUM SERPL-SCNC: 137 MMOL/L — SIGNIFICANT CHANGE UP (ref 135–145)
TROPONIN T, HIGH SENSITIVITY RESULT: 8 NG/L — SIGNIFICANT CHANGE UP (ref 0–51)
WBC # BLD: 8.5 K/UL — SIGNIFICANT CHANGE UP (ref 3.8–10.5)
WBC # FLD AUTO: 8.5 K/UL — SIGNIFICANT CHANGE UP (ref 3.8–10.5)

## 2020-04-05 PROCEDURE — 99233 SBSQ HOSP IP/OBS HIGH 50: CPT | Mod: GC

## 2020-04-05 RX ADMIN — ENOXAPARIN SODIUM 40 MILLIGRAM(S): 100 INJECTION SUBCUTANEOUS at 11:45

## 2020-04-05 RX ADMIN — TAMSULOSIN HYDROCHLORIDE 0.4 MILLIGRAM(S): 0.4 CAPSULE ORAL at 21:22

## 2020-04-05 RX ADMIN — Medication 100 MILLIGRAM(S): at 05:38

## 2020-04-05 RX ADMIN — Medication 1 MILLIGRAM(S): at 11:45

## 2020-04-05 RX ADMIN — Medication 100 MILLIGRAM(S): at 16:59

## 2020-04-05 RX ADMIN — Medication 25 MILLIGRAM(S): at 05:38

## 2020-04-05 RX ADMIN — ATORVASTATIN CALCIUM 40 MILLIGRAM(S): 80 TABLET, FILM COATED ORAL at 21:22

## 2020-04-05 RX ADMIN — Medication 200 MILLIGRAM(S): at 21:22

## 2020-04-05 RX ADMIN — Medication 100 MILLIGRAM(S): at 21:22

## 2020-04-05 RX ADMIN — Medication 81 MILLIGRAM(S): at 11:45

## 2020-04-05 RX ADMIN — Medication 1: at 11:52

## 2020-04-05 RX ADMIN — Medication 200 MILLIGRAM(S): at 12:57

## 2020-04-05 RX ADMIN — Medication 1: at 16:59

## 2020-04-05 RX ADMIN — Medication 100 MILLIGRAM(S): at 05:37

## 2020-04-05 RX ADMIN — Medication 100 MILLIGRAM(S): at 11:45

## 2020-04-05 RX ADMIN — Medication 200 MILLIGRAM(S): at 05:38

## 2020-04-05 RX ADMIN — MONTELUKAST 10 MILLIGRAM(S): 4 TABLET, CHEWABLE ORAL at 11:45

## 2020-04-05 NOTE — PROGRESS NOTE ADULT - PROBLEM SELECTOR PLAN 1
2/2 to COVID infection  - currently on 2L NC saturating 95% without coughing fits overnight  - will try to wean off and ambuluate today  - symptomatic treatment w/ albuterol inhaler, tessalon, robitussin and hycadin  - s/p hydroxychloroquine load and maintenance

## 2020-04-05 NOTE — PROGRESS NOTE ADULT - PROBLEM SELECTOR PLAN 8
Problem: Discharge planning issues. Plan; Transitions of Care Status:  1. Name of PCP: Dr. Crowe  2. PCP contacted on Admission: [X]Y []N  3. PCP contacted at Discharge: []Y []N  4. Post-Discharge Appointment Date and Location:   5. Summary of Handoff given to PCP:

## 2020-04-05 NOTE — PROGRESS NOTE ADULT - ASSESSMENT
59M PMH CAD s/p SUBHA x 2 (2017), borderline T2DM, HTN, HLD p/w cough, SOB, and fever, admitted with sepsis and hypoxic resp failure due to COVID19 infection, now improving.

## 2020-04-05 NOTE — PROGRESS NOTE ADULT - SUBJECTIVE AND OBJECTIVE BOX
PROGRESS NOTE:     CONTACT INFO:  Mary Albert MD  [id number]    Patient is a 59y old  Male who presents with a chief complaint of cough, SOB (04 Apr 2020 06:59)      SUBJECTIVE / OVERNIGHT EVENTS:  No acute events overnight. Patient seen and evaluated at bedside. No fever/chills.  Denies SOB at rest, chest pain, palpitations, abdominal pain, nausea/vomiting. Patient reports his cough has markedly improved. Still reports some weakness with ambulation.    ADDITIONAL REVIEW OF SYSTEMS:    MEDICATIONS  (STANDING):  aspirin enteric coated 81 milliGRAM(s) Oral daily  atorvastatin 40 milliGRAM(s) Oral at bedtime  benzonatate 200 milliGRAM(s) Oral three times a day  dextrose 5%. 1000 milliLiter(s) (50 mL/Hr) IV Continuous <Continuous>  dextrose 50% Injectable 12.5 Gram(s) IV Push once  dextrose 50% Injectable 25 Gram(s) IV Push once  dextrose 50% Injectable 25 Gram(s) IV Push once  enoxaparin Injectable 40 milliGRAM(s) SubCutaneous daily  folic acid 1 milliGRAM(s) Oral daily  guaiFENesin   Syrup  (Sugar-Free) 100 milliGRAM(s) Oral every 4 hours  hydrocodone/homatropine Syrup 5 milliLiter(s) Oral three times a day  insulin lispro (HumaLOG) corrective regimen sliding scale   SubCutaneous three times a day before meals  insulin lispro (HumaLOG) corrective regimen sliding scale   SubCutaneous at bedtime  metoprolol succinate ER 25 milliGRAM(s) Oral daily  montelukast 10 milliGRAM(s) Oral daily  tamsulosin 0.4 milliGRAM(s) Oral at bedtime    MEDICATIONS  (PRN):  acetaminophen   Tablet .. 650 milliGRAM(s) Oral every 4 hours PRN Temp greater or equal to 38.5C (101.3F)  ALBUTerol    90 MICROgram(s) HFA Inhaler 2 Puff(s) Inhalation every 4 hours PRN Shortness of Breath and/or Wheezing  dextrose 40% Gel 15 Gram(s) Oral once PRN Blood Glucose LESS THAN 70 milliGRAM(s)/deciliter  glucagon  Injectable 1 milliGRAM(s) IntraMuscular once PRN Glucose LESS THAN 70 milligrams/deciliter      CAPILLARY BLOOD GLUCOSE      POCT Blood Glucose.: 128 mg/dL (05 Apr 2020 07:13)  POCT Blood Glucose.: 178 mg/dL (04 Apr 2020 21:15)  POCT Blood Glucose.: 277 mg/dL (04 Apr 2020 16:46)  POCT Blood Glucose.: 119 mg/dL (04 Apr 2020 11:36)    I&O's Summary    04 Apr 2020 07:01  -  05 Apr 2020 07:00  --------------------------------------------------------  IN: 380 mL / OUT: 1050 mL / NET: -670 mL        PHYSICAL EXAM:  Vital Signs Last 24 Hrs  T(C): 36.6 (05 Apr 2020 05:40), Max: 36.7 (04 Apr 2020 17:24)  T(F): 97.9 (05 Apr 2020 05:40), Max: 98 (04 Apr 2020 17:24)  HR: 65 (05 Apr 2020 05:40) (62 - 70)  BP: 108/71 (05 Apr 2020 05:40) (100/68 - 126/73)  BP(mean): --  RR: 20 (05 Apr 2020 05:40) (20 - 20)  SpO2: 95% (05 Apr 2020 05:40) (87% - 96%)    CONSTITUTIONAL: NAD, well-developed, sitting in a chair, on 2L NC  RESPIRATORY: Normal respiratory effort; lungs are clear to auscultation bilaterally  CARDIOVASCULAR: Regular rate and rhythm, normal S1 and S2, no murmur/rub/gallop; No lower extremity edema;   ABDOMEN: Nontender to palpation, normoactive bowel sounds, no rebound/guarding;   MUSCLOSKELETAL: no clubbing or cyanosis of digits;  PSYCH: A+O to person, place, and time; affect appropriate    LABS:                        12.6   8.50  )-----------( 211      ( 05 Apr 2020 07:31 )             38.6     04-05    137  |  99  |  12  ----------------------------<  122<H>  3.9   |  27  |  0.87    Ca    8.1<L>      05 Apr 2020 07:27  Phos  3.1     04-05  Mg     2.6     04-05    TPro  6.1  /  Alb  2.9<L>  /  TBili  0.5  /  DBili  x   /  AST  53<H>  /  ALT  46<H>  /  AlkPhos  58  04-05                RADIOLOGY & ADDITIONAL TESTS:  Results Reviewed:   Imaging Personally Reviewed:  Electrocardiogram Personally Reviewed:    COORDINATION OF CARE:  Care Discussed with Consultants/Other Providers [Y/N]:  Prior or Outpatient Records Reviewed [Y/N]:

## 2020-04-06 DIAGNOSIS — E13.9 OTHER SPECIFIED DIABETES MELLITUS WITHOUT COMPLICATIONS: ICD-10-CM

## 2020-04-06 LAB
ANION GAP SERPL CALC-SCNC: 14 MMOL/L — SIGNIFICANT CHANGE UP (ref 5–17)
BASOPHILS # BLD AUTO: 0.03 K/UL — SIGNIFICANT CHANGE UP (ref 0–0.2)
BASOPHILS NFR BLD AUTO: 0.4 % — SIGNIFICANT CHANGE UP (ref 0–2)
BUN SERPL-MCNC: 11 MG/DL — SIGNIFICANT CHANGE UP (ref 7–23)
CALCIUM SERPL-MCNC: 8.3 MG/DL — LOW (ref 8.4–10.5)
CHLORIDE SERPL-SCNC: 99 MMOL/L — SIGNIFICANT CHANGE UP (ref 96–108)
CO2 SERPL-SCNC: 25 MMOL/L — SIGNIFICANT CHANGE UP (ref 22–31)
CREAT SERPL-MCNC: 0.86 MG/DL — SIGNIFICANT CHANGE UP (ref 0.5–1.3)
EOSINOPHIL # BLD AUTO: 0.18 K/UL — SIGNIFICANT CHANGE UP (ref 0–0.5)
EOSINOPHIL NFR BLD AUTO: 2.3 % — SIGNIFICANT CHANGE UP (ref 0–6)
GLUCOSE BLDC GLUCOMTR-MCNC: 112 MG/DL — HIGH (ref 70–99)
GLUCOSE BLDC GLUCOMTR-MCNC: 131 MG/DL — HIGH (ref 70–99)
GLUCOSE BLDC GLUCOMTR-MCNC: 139 MG/DL — HIGH (ref 70–99)
GLUCOSE BLDC GLUCOMTR-MCNC: 155 MG/DL — HIGH (ref 70–99)
GLUCOSE SERPL-MCNC: 128 MG/DL — HIGH (ref 70–99)
HCT VFR BLD CALC: 40.1 % — SIGNIFICANT CHANGE UP (ref 39–50)
HGB BLD-MCNC: 13 G/DL — SIGNIFICANT CHANGE UP (ref 13–17)
IMM GRANULOCYTES NFR BLD AUTO: 1.3 % — SIGNIFICANT CHANGE UP (ref 0–1.5)
LYMPHOCYTES # BLD AUTO: 1.13 K/UL — SIGNIFICANT CHANGE UP (ref 1–3.3)
LYMPHOCYTES # BLD AUTO: 14.2 % — SIGNIFICANT CHANGE UP (ref 13–44)
MAGNESIUM SERPL-MCNC: 2.4 MG/DL — SIGNIFICANT CHANGE UP (ref 1.6–2.6)
MCHC RBC-ENTMCNC: 28.4 PG — SIGNIFICANT CHANGE UP (ref 27–34)
MCHC RBC-ENTMCNC: 32.4 GM/DL — SIGNIFICANT CHANGE UP (ref 32–36)
MCV RBC AUTO: 87.7 FL — SIGNIFICANT CHANGE UP (ref 80–100)
MONOCYTES # BLD AUTO: 0.71 K/UL — SIGNIFICANT CHANGE UP (ref 0–0.9)
MONOCYTES NFR BLD AUTO: 8.9 % — SIGNIFICANT CHANGE UP (ref 2–14)
NEUTROPHILS # BLD AUTO: 5.82 K/UL — SIGNIFICANT CHANGE UP (ref 1.8–7.4)
NEUTROPHILS NFR BLD AUTO: 72.9 % — SIGNIFICANT CHANGE UP (ref 43–77)
NRBC # BLD: 0 /100 WBCS — SIGNIFICANT CHANGE UP (ref 0–0)
PHOSPHATE SERPL-MCNC: 3.4 MG/DL — SIGNIFICANT CHANGE UP (ref 2.5–4.5)
PLATELET # BLD AUTO: 244 K/UL — SIGNIFICANT CHANGE UP (ref 150–400)
POTASSIUM SERPL-MCNC: 3.5 MMOL/L — SIGNIFICANT CHANGE UP (ref 3.5–5.3)
POTASSIUM SERPL-SCNC: 3.5 MMOL/L — SIGNIFICANT CHANGE UP (ref 3.5–5.3)
RBC # BLD: 4.57 M/UL — SIGNIFICANT CHANGE UP (ref 4.2–5.8)
RBC # FLD: 11.9 % — SIGNIFICANT CHANGE UP (ref 10.3–14.5)
SODIUM SERPL-SCNC: 138 MMOL/L — SIGNIFICANT CHANGE UP (ref 135–145)
WBC # BLD: 7.97 K/UL — SIGNIFICANT CHANGE UP (ref 3.8–10.5)
WBC # FLD AUTO: 7.97 K/UL — SIGNIFICANT CHANGE UP (ref 3.8–10.5)

## 2020-04-06 RX ADMIN — Medication 1 MILLIGRAM(S): at 11:19

## 2020-04-06 RX ADMIN — Medication 25 MILLIGRAM(S): at 05:01

## 2020-04-06 RX ADMIN — Medication 100 MILLIGRAM(S): at 05:01

## 2020-04-06 RX ADMIN — Medication 200 MILLIGRAM(S): at 15:08

## 2020-04-06 RX ADMIN — Medication 1: at 18:12

## 2020-04-06 RX ADMIN — Medication 200 MILLIGRAM(S): at 20:34

## 2020-04-06 RX ADMIN — ATORVASTATIN CALCIUM 40 MILLIGRAM(S): 80 TABLET, FILM COATED ORAL at 20:33

## 2020-04-06 RX ADMIN — Medication 100 MILLIGRAM(S): at 20:34

## 2020-04-06 RX ADMIN — Medication 100 MILLIGRAM(S): at 11:00

## 2020-04-06 RX ADMIN — MONTELUKAST 10 MILLIGRAM(S): 4 TABLET, CHEWABLE ORAL at 13:50

## 2020-04-06 RX ADMIN — Medication 81 MILLIGRAM(S): at 11:20

## 2020-04-06 RX ADMIN — TAMSULOSIN HYDROCHLORIDE 0.4 MILLIGRAM(S): 0.4 CAPSULE ORAL at 20:34

## 2020-04-06 RX ADMIN — Medication 100 MILLIGRAM(S): at 18:27

## 2020-04-06 RX ADMIN — Medication 200 MILLIGRAM(S): at 05:00

## 2020-04-06 RX ADMIN — ENOXAPARIN SODIUM 40 MILLIGRAM(S): 100 INJECTION SUBCUTANEOUS at 11:20

## 2020-04-06 NOTE — PHYSICAL THERAPY INITIAL EVALUATION ADULT - PERTINENT HX OF CURRENT PROBLEM, REHAB EVAL
Pt is a 59M PMH CAD s/p SUBHA x 2 (2017), T2DM, HTN, and HLD p/w cough, SOB, and fever, admitted with sepsis and hypoxic resp failure due to COVID19 infection, now improving, weaning supplemental oxygen. +CXR 3/31/2020: Left lower lung ill-defined patchy and linear opacities are unchanged. Right mid to lower lung linear atelectasis.

## 2020-04-06 NOTE — PROGRESS NOTE ADULT - PROBLEM SELECTOR PLAN 6
glucose initially 300s on BMP, . A1c 7.6. CC diet. C/w HISS. glucose initially 300s on BMP, . A1c 7.6. CC diet. C/w HISS.  Fingersticks well controlled

## 2020-04-06 NOTE — PHYSICAL THERAPY INITIAL EVALUATION ADULT - PLANNED THERAPY INTERVENTIONS, PT EVAL
balance training/bed mobility training/GOAL: Stair Negotiation Training: Patient will be able to negotiate up & down 1 flight of stairs with unilateral rail, step to gait pattern, in 2 weeks./strengthening/gait training

## 2020-04-06 NOTE — PROGRESS NOTE ADULT - ASSESSMENT
59M PMH CAD s/p SUBHA x 2 (2017), borderline T2DM, HTN, HLD p/w cough, SOB, and fever, admitted with sepsis and hypoxic resp failure due to COVID19 infection, now improving, weaning supplemental oxygen. 59M PMH CAD s/p SUBHA x 2 (2017), T2DM, HTN, and HLD p/w cough, SOB, and fever, admitted with sepsis and hypoxic resp failure due to COVID19 infection, now improving, weaning supplemental oxygen.

## 2020-04-06 NOTE — PHYSICAL THERAPY INITIAL EVALUATION ADULT - ADDITIONAL COMMENTS
Pt lives with his wife and son in a house with 4-5 steps to enter, +HR and no steps inside. Pt was independent with all ADLs and ambulation PTA. Pt did not use a AD for ambulation PTA. Pt states his wife is available to assist when needed.

## 2020-04-06 NOTE — PROGRESS NOTE ADULT - SUBJECTIVE AND OBJECTIVE BOX
PROGRESS NOTE:   Authored by Dr. Barby Antony MD Pager [phone] [facility], [id number] [place]     Patient is a 59y old  Male who presents with a chief complaint of cough, SOB (05 Apr 2020 09:54)    SUBJECTIVE / OVERNIGHT EVENTS:  No acute events overnight. Patient seen and evaluated at bedside. No fever/chills.  Denies SOB at rest, chest pain, palpitations, abdominal pain, nausea/vomiting. Patient reports his cough has markedly improved. Still reports some weakness with ambulation.    ADDITIONAL REVIEW OF SYSTEMS: as above     MEDICATIONS  (STANDING):  aspirin enteric coated 81 milliGRAM(s) Oral daily  atorvastatin 40 milliGRAM(s) Oral at bedtime  benzonatate 200 milliGRAM(s) Oral three times a day  dextrose 5%. 1000 milliLiter(s) (50 mL/Hr) IV Continuous <Continuous>  dextrose 50% Injectable 12.5 Gram(s) IV Push once  dextrose 50% Injectable 25 Gram(s) IV Push once  dextrose 50% Injectable 25 Gram(s) IV Push once  enoxaparin Injectable 40 milliGRAM(s) SubCutaneous daily  folic acid 1 milliGRAM(s) Oral daily  guaiFENesin   Syrup  (Sugar-Free) 100 milliGRAM(s) Oral every 4 hours  hydrocodone/homatropine Syrup 5 milliLiter(s) Oral three times a day  insulin lispro (HumaLOG) corrective regimen sliding scale   SubCutaneous three times a day before meals  insulin lispro (HumaLOG) corrective regimen sliding scale   SubCutaneous at bedtime  metoprolol succinate ER 25 milliGRAM(s) Oral daily  montelukast 10 milliGRAM(s) Oral daily  tamsulosin 0.4 milliGRAM(s) Oral at bedtime    MEDICATIONS  (PRN):  acetaminophen   Tablet .. 650 milliGRAM(s) Oral every 4 hours PRN Temp greater or equal to 38.5C (101.3F)  ALBUTerol    90 MICROgram(s) HFA Inhaler 2 Puff(s) Inhalation every 4 hours PRN Shortness of Breath and/or Wheezing  dextrose 40% Gel 15 Gram(s) Oral once PRN Blood Glucose LESS THAN 70 milliGRAM(s)/deciliter  glucagon  Injectable 1 milliGRAM(s) IntraMuscular once PRN Glucose LESS THAN 70 milligrams/deciliter      CAPILLARY BLOOD GLUCOSE      POCT Blood Glucose.: 153 mg/dL (05 Apr 2020 21:13)  POCT Blood Glucose.: 177 mg/dL (05 Apr 2020 16:42)  POCT Blood Glucose.: 152 mg/dL (05 Apr 2020 11:48)  POCT Blood Glucose.: 128 mg/dL (05 Apr 2020 07:13)    I&O's Summary    04 Apr 2020 07:01  -  05 Apr 2020 07:00  --------------------------------------------------------  IN: 380 mL / OUT: 1050 mL / NET: -670 mL    05 Apr 2020 07:01  -  06 Apr 2020 06:44  --------------------------------------------------------  IN: 1000 mL / OUT: 1600 mL / NET: -600 mL        PHYSICAL EXAM:  Vital Signs Last 24 Hrs  T(C): 36.7 (06 Apr 2020 05:03), Max: 37.2 (05 Apr 2020 14:24)  T(F): 98.1 (06 Apr 2020 05:03), Max: 98.9 (05 Apr 2020 14:24)  HR: 71 (06 Apr 2020 05:03) (61 - 71)  BP: 121/78 (06 Apr 2020 05:03) (107/69 - 121/78)  BP(mean): --  RR: 20 (06 Apr 2020 05:03) (20 - 20)  SpO2: 91% (06 Apr 2020 05:03) (91% - 96%)    CONSTITUTIONAL: NAD, well-developed, sitting in a chair, on 3L NC  RESPIRATORY: Normal respiratory effort; lungs are clear to auscultation bilaterally  CARDIOVASCULAR: Regular rate and rhythm, normal S1 and S2, no murmur/rub/gallop; No lower extremity edema;   ABDOMEN: Nontender to palpation, normoactive bowel sounds, no rebound/guarding;   MUSCLOSKELETAL: no clubbing or cyanosis of digits;  PSYCH: A+O to person, place, and time; affect appropriate    LABS:                        13.0   7.97  )-----------( 244      ( 06 Apr 2020 05:58 )             40.1     04-06    138  |  99  |  11  ----------------------------<  128<H>  3.5   |  25  |  0.86    Ca    8.3<L>      06 Apr 2020 05:57  Phos  3.4     04-06  Mg     2.4     04-06    TPro  6.1  /  Alb  2.9<L>  /  TBili  0.5  /  DBili  x   /  AST  53<H>  /  ALT  46<H>  /  AlkPhos  58  04-05                RADIOLOGY & ADDITIONAL TESTS:  Results Reviewed:   Imaging Personally Reviewed:  Electrocardiogram Personally Reviewed:    COORDINATION OF CARE:  Care Discussed with Consultants/Other Providers [Y/N]:  Prior or Outpatient Records Reviewed [Y/N]: PROGRESS NOTE:   Authored by Dr. Barby Antony MD Pager [phone] [facility], [id number] [place]     Patient is a 59y old  Male who presents with a chief complaint of cough, SOB (05 Apr 2020 09:54)    SUBJECTIVE / OVERNIGHT EVENTS:  No acute events overnight. Patient seen and evaluated at bedside. No fever/chills.  Denies SOB at rest, chest pain, palpitations, abdominal pain, nausea/vomiting. Patient reports his cough has markedly improved. Still reports some weakness with ambulation.    ADDITIONAL REVIEW OF SYSTEMS: as above     MEDICATIONS  (STANDING):  aspirin enteric coated 81 milliGRAM(s) Oral daily  atorvastatin 40 milliGRAM(s) Oral at bedtime  benzonatate 200 milliGRAM(s) Oral three times a day  dextrose 5%. 1000 milliLiter(s) (50 mL/Hr) IV Continuous <Continuous>  dextrose 50% Injectable 12.5 Gram(s) IV Push once  dextrose 50% Injectable 25 Gram(s) IV Push once  dextrose 50% Injectable 25 Gram(s) IV Push once  enoxaparin Injectable 40 milliGRAM(s) SubCutaneous daily  folic acid 1 milliGRAM(s) Oral daily  guaiFENesin   Syrup  (Sugar-Free) 100 milliGRAM(s) Oral every 4 hours  hydrocodone/homatropine Syrup 5 milliLiter(s) Oral three times a day  insulin lispro (HumaLOG) corrective regimen sliding scale   SubCutaneous three times a day before meals  insulin lispro (HumaLOG) corrective regimen sliding scale   SubCutaneous at bedtime  metoprolol succinate ER 25 milliGRAM(s) Oral daily  montelukast 10 milliGRAM(s) Oral daily  tamsulosin 0.4 milliGRAM(s) Oral at bedtime    MEDICATIONS  (PRN):  acetaminophen   Tablet .. 650 milliGRAM(s) Oral every 4 hours PRN Temp greater or equal to 38.5C (101.3F)  ALBUTerol    90 MICROgram(s) HFA Inhaler 2 Puff(s) Inhalation every 4 hours PRN Shortness of Breath and/or Wheezing  dextrose 40% Gel 15 Gram(s) Oral once PRN Blood Glucose LESS THAN 70 milliGRAM(s)/deciliter  glucagon  Injectable 1 milliGRAM(s) IntraMuscular once PRN Glucose LESS THAN 70 milligrams/deciliter      CAPILLARY BLOOD GLUCOSE      POCT Blood Glucose.: 153 mg/dL (05 Apr 2020 21:13)  POCT Blood Glucose.: 177 mg/dL (05 Apr 2020 16:42)  POCT Blood Glucose.: 152 mg/dL (05 Apr 2020 11:48)  POCT Blood Glucose.: 128 mg/dL (05 Apr 2020 07:13)    I&O's Summary    04 Apr 2020 07:01  -  05 Apr 2020 07:00  --------------------------------------------------------  IN: 380 mL / OUT: 1050 mL / NET: -670 mL    05 Apr 2020 07:01  -  06 Apr 2020 06:44  --------------------------------------------------------  IN: 1000 mL / OUT: 1600 mL / NET: -600 mL        PHYSICAL EXAM:  Vital Signs Last 24 Hrs  T(C): 36.7 (06 Apr 2020 05:03), Max: 37.2 (05 Apr 2020 14:24)  T(F): 98.1 (06 Apr 2020 05:03), Max: 98.9 (05 Apr 2020 14:24)  HR: 71 (06 Apr 2020 05:03) (61 - 71)  BP: 121/78 (06 Apr 2020 05:03) (107/69 - 121/78)  BP(mean): --  RR: 20 (06 Apr 2020 05:03) (20 - 20)  SpO2: 91% (06 Apr 2020 05:03) (91% - 96%)    CONSTITUTIONAL: NAD, well-developed, sitting in a chair, on room air   RESPIRATORY: Normal respiratory effort; lungs are clear to auscultation bilaterally  CARDIOVASCULAR: Regular rate and rhythm, normal S1 and S2, no murmur/rub/gallop; No lower extremity edema;   ABDOMEN: Nontender to palpation, normoactive bowel sounds, no rebound/guarding;   MUSCLOSKELETAL: no clubbing or cyanosis of digits;  PSYCH: A+O to person, place, and time; affect appropriate    LABS:                        13.0   7.97  )-----------( 244      ( 06 Apr 2020 05:58 )             40.1     04-06    138  |  99  |  11  ----------------------------<  128<H>  3.5   |  25  |  0.86    Ca    8.3<L>      06 Apr 2020 05:57  Phos  3.4     04-06  Mg     2.4     04-06    TPro  6.1  /  Alb  2.9<L>  /  TBili  0.5  /  DBili  x   /  AST  53<H>  /  ALT  46<H>  /  AlkPhos  58  04-05                RADIOLOGY & ADDITIONAL TESTS:  Results Reviewed:   Imaging Personally Reviewed:  Electrocardiogram Personally Reviewed:    COORDINATION OF CARE:  Care Discussed with Consultants/Other Providers [Y/N]:  Prior or Outpatient Records Reviewed [Y/N]:

## 2020-04-06 NOTE — PROGRESS NOTE ADULT - PROBLEM SELECTOR PLAN 1
2/2 to COVID infection  - currently on 3L NC saturating 95% without coughing fits overnight  - will try to wean off and ambuluate today  - symptomatic treatment w/ albuterol inhaler, tessalon, robitussin and hycadin  - s/p hydroxychloroquine load and maintenance 2/2 to COVID infection  - was on 3L NC, now weaned to RA saturating 92% without coughing fits overnight  - will try to ambulate today   - symptomatic treatment w/ albuterol inhaler, tessalon, robitussin and hycadin  - s/p hydroxychloroquine load and maintenance

## 2020-04-06 NOTE — PROGRESS NOTE ADULT - PROBLEM SELECTOR PLAN 2
COVID19+  - c/w oxygen and supportive measures COVID19+  - c/w oxygen as needed and supportive measures

## 2020-04-06 NOTE — PROGRESS NOTE ADULT - PROBLEM SELECTOR PLAN 3
with bandemia to 7% on presentation, s/p CTX and zithro x 1 in ED as CXR infiltrate unilateral; however, since COVID+, less likely bacterial PNA.  - avoid IVF to prevent ARDS  - hold abx at this time with bandemia to 7% on presentation, s/p CTX and zithro x 1 in ED as CXR infiltrate unilateral; however, since COVID+, less likely bacterial PNA. No longer meets sepsis criteria   - avoid IVF to prevent ARDS  - hold abx at this time

## 2020-04-06 NOTE — PROGRESS NOTE ADULT - PROBLEM SELECTOR PLAN 7
GOC: Full code  Diet: CC  DVT prophylaxis: lovenox GOC: Full code  Diet: CC  DVT prophylaxis: lovenox  Dispo: PT eval due to weakness, awaiting recs

## 2020-04-07 DIAGNOSIS — N40.0 BENIGN PROSTATIC HYPERPLASIA WITHOUT LOWER URINARY TRACT SYMPTOMS: ICD-10-CM

## 2020-04-07 LAB
ALBUMIN SERPL ELPH-MCNC: 3.2 G/DL — LOW (ref 3.3–5)
ALP SERPL-CCNC: 61 U/L — SIGNIFICANT CHANGE UP (ref 40–120)
ALT FLD-CCNC: 72 U/L — HIGH (ref 10–45)
ANION GAP SERPL CALC-SCNC: 13 MMOL/L — SIGNIFICANT CHANGE UP (ref 5–17)
AST SERPL-CCNC: 63 U/L — HIGH (ref 10–40)
BASOPHILS # BLD AUTO: 0.03 K/UL — SIGNIFICANT CHANGE UP (ref 0–0.2)
BASOPHILS NFR BLD AUTO: 0.3 % — SIGNIFICANT CHANGE UP (ref 0–2)
BILIRUB SERPL-MCNC: 0.7 MG/DL — SIGNIFICANT CHANGE UP (ref 0.2–1.2)
BUN SERPL-MCNC: 10 MG/DL — SIGNIFICANT CHANGE UP (ref 7–23)
CALCIUM SERPL-MCNC: 9 MG/DL — SIGNIFICANT CHANGE UP (ref 8.4–10.5)
CHLORIDE SERPL-SCNC: 99 MMOL/L — SIGNIFICANT CHANGE UP (ref 96–108)
CO2 SERPL-SCNC: 24 MMOL/L — SIGNIFICANT CHANGE UP (ref 22–31)
CREAT SERPL-MCNC: 0.8 MG/DL — SIGNIFICANT CHANGE UP (ref 0.5–1.3)
CRP SERPL-MCNC: 4.03 MG/DL — HIGH (ref 0–0.4)
EOSINOPHIL # BLD AUTO: 0.12 K/UL — SIGNIFICANT CHANGE UP (ref 0–0.5)
EOSINOPHIL NFR BLD AUTO: 1.3 % — SIGNIFICANT CHANGE UP (ref 0–6)
FERRITIN SERPL-MCNC: 414 NG/ML — HIGH (ref 30–400)
GLUCOSE BLDC GLUCOMTR-MCNC: 133 MG/DL — HIGH (ref 70–99)
GLUCOSE BLDC GLUCOMTR-MCNC: 139 MG/DL — HIGH (ref 70–99)
GLUCOSE BLDC GLUCOMTR-MCNC: 152 MG/DL — HIGH (ref 70–99)
GLUCOSE BLDC GLUCOMTR-MCNC: 217 MG/DL — HIGH (ref 70–99)
GLUCOSE SERPL-MCNC: 134 MG/DL — HIGH (ref 70–99)
HCT VFR BLD CALC: 39.2 % — SIGNIFICANT CHANGE UP (ref 39–50)
HGB BLD-MCNC: 12.7 G/DL — LOW (ref 13–17)
IMM GRANULOCYTES NFR BLD AUTO: 1.5 % — SIGNIFICANT CHANGE UP (ref 0–1.5)
LYMPHOCYTES # BLD AUTO: 0.75 K/UL — LOW (ref 1–3.3)
LYMPHOCYTES # BLD AUTO: 8.4 % — LOW (ref 13–44)
MAGNESIUM SERPL-MCNC: 2.4 MG/DL — SIGNIFICANT CHANGE UP (ref 1.6–2.6)
MCHC RBC-ENTMCNC: 28.5 PG — SIGNIFICANT CHANGE UP (ref 27–34)
MCHC RBC-ENTMCNC: 32.4 GM/DL — SIGNIFICANT CHANGE UP (ref 32–36)
MCV RBC AUTO: 87.9 FL — SIGNIFICANT CHANGE UP (ref 80–100)
MONOCYTES # BLD AUTO: 0.78 K/UL — SIGNIFICANT CHANGE UP (ref 0–0.9)
MONOCYTES NFR BLD AUTO: 8.8 % — SIGNIFICANT CHANGE UP (ref 2–14)
NEUTROPHILS # BLD AUTO: 7.1 K/UL — SIGNIFICANT CHANGE UP (ref 1.8–7.4)
NEUTROPHILS NFR BLD AUTO: 79.7 % — HIGH (ref 43–77)
NRBC # BLD: 0 /100 WBCS — SIGNIFICANT CHANGE UP (ref 0–0)
PHOSPHATE SERPL-MCNC: 3.6 MG/DL — SIGNIFICANT CHANGE UP (ref 2.5–4.5)
PLATELET # BLD AUTO: 298 K/UL — SIGNIFICANT CHANGE UP (ref 150–400)
POTASSIUM SERPL-MCNC: 3.9 MMOL/L — SIGNIFICANT CHANGE UP (ref 3.5–5.3)
POTASSIUM SERPL-SCNC: 3.9 MMOL/L — SIGNIFICANT CHANGE UP (ref 3.5–5.3)
PROT SERPL-MCNC: 6.7 G/DL — SIGNIFICANT CHANGE UP (ref 6–8.3)
RBC # BLD: 4.46 M/UL — SIGNIFICANT CHANGE UP (ref 4.2–5.8)
RBC # FLD: 11.7 % — SIGNIFICANT CHANGE UP (ref 10.3–14.5)
SODIUM SERPL-SCNC: 136 MMOL/L — SIGNIFICANT CHANGE UP (ref 135–145)
WBC # BLD: 8.91 K/UL — SIGNIFICANT CHANGE UP (ref 3.8–10.5)
WBC # FLD AUTO: 8.91 K/UL — SIGNIFICANT CHANGE UP (ref 3.8–10.5)

## 2020-04-07 PROCEDURE — 99233 SBSQ HOSP IP/OBS HIGH 50: CPT

## 2020-04-07 RX ORDER — TICAGRELOR 90 MG/1
90 TABLET ORAL EVERY 12 HOURS
Refills: 0 | Status: DISCONTINUED | OUTPATIENT
Start: 2020-04-07 | End: 2020-04-08

## 2020-04-07 RX ADMIN — TAMSULOSIN HYDROCHLORIDE 0.4 MILLIGRAM(S): 0.4 CAPSULE ORAL at 22:26

## 2020-04-07 RX ADMIN — Medication 100 MILLIGRAM(S): at 14:16

## 2020-04-07 RX ADMIN — Medication 25 MILLIGRAM(S): at 05:12

## 2020-04-07 RX ADMIN — Medication 1 MILLIGRAM(S): at 11:56

## 2020-04-07 RX ADMIN — Medication 200 MILLIGRAM(S): at 22:26

## 2020-04-07 RX ADMIN — Medication 100 MILLIGRAM(S): at 10:29

## 2020-04-07 RX ADMIN — Medication 2: at 11:20

## 2020-04-07 RX ADMIN — MONTELUKAST 10 MILLIGRAM(S): 4 TABLET, CHEWABLE ORAL at 16:16

## 2020-04-07 RX ADMIN — Medication 200 MILLIGRAM(S): at 05:13

## 2020-04-07 RX ADMIN — ATORVASTATIN CALCIUM 40 MILLIGRAM(S): 80 TABLET, FILM COATED ORAL at 22:25

## 2020-04-07 RX ADMIN — Medication 100 MILLIGRAM(S): at 05:11

## 2020-04-07 RX ADMIN — Medication 1: at 18:22

## 2020-04-07 RX ADMIN — Medication 81 MILLIGRAM(S): at 11:56

## 2020-04-07 RX ADMIN — TICAGRELOR 90 MILLIGRAM(S): 90 TABLET ORAL at 18:23

## 2020-04-07 RX ADMIN — Medication 100 MILLIGRAM(S): at 01:12

## 2020-04-07 RX ADMIN — Medication 200 MILLIGRAM(S): at 16:17

## 2020-04-07 RX ADMIN — ENOXAPARIN SODIUM 40 MILLIGRAM(S): 100 INJECTION SUBCUTANEOUS at 11:56

## 2020-04-07 NOTE — PROGRESS NOTE ADULT - SUBJECTIVE AND OBJECTIVE BOX
PROGRESS NOTE:   Authoted by Dr. Luz Elena Oneill MD  Pager [phone]     Patient is a 59y old  Male who presents with a chief complaint of cough, SOB (06 Apr 2020 06:44)      SUBJECTIVE / OVERNIGHT EVENTS: Patient seen and examined at bedside - patient laying in bed, still feels a little weak and winded when ambulating but overall feels much better. He is hoping to go home tomorrow. No chest pain, no sob at rest.    ADDITIONAL REVIEW OF SYSTEMS: as above    MEDICATIONS  (STANDING):  aspirin enteric coated 81 milliGRAM(s) Oral daily  atorvastatin 40 milliGRAM(s) Oral at bedtime  benzonatate 200 milliGRAM(s) Oral three times a day  dextrose 5%. 1000 milliLiter(s) (50 mL/Hr) IV Continuous <Continuous>  dextrose 50% Injectable 12.5 Gram(s) IV Push once  dextrose 50% Injectable 25 Gram(s) IV Push once  dextrose 50% Injectable 25 Gram(s) IV Push once  enoxaparin Injectable 40 milliGRAM(s) SubCutaneous daily  folic acid 1 milliGRAM(s) Oral daily  guaiFENesin   Syrup  (Sugar-Free) 100 milliGRAM(s) Oral every 4 hours  hydrocodone/homatropine Syrup 5 milliLiter(s) Oral three times a day  insulin lispro (HumaLOG) corrective regimen sliding scale   SubCutaneous three times a day before meals  insulin lispro (HumaLOG) corrective regimen sliding scale   SubCutaneous at bedtime  metoprolol succinate ER 25 milliGRAM(s) Oral daily  montelukast 10 milliGRAM(s) Oral daily  tamsulosin 0.4 milliGRAM(s) Oral at bedtime    MEDICATIONS  (PRN):  acetaminophen   Tablet .. 650 milliGRAM(s) Oral every 4 hours PRN Temp greater or equal to 38.5C (101.3F)  ALBUTerol    90 MICROgram(s) HFA Inhaler 2 Puff(s) Inhalation every 4 hours PRN Shortness of Breath and/or Wheezing  dextrose 40% Gel 15 Gram(s) Oral once PRN Blood Glucose LESS THAN 70 milliGRAM(s)/deciliter  glucagon  Injectable 1 milliGRAM(s) IntraMuscular once PRN Glucose LESS THAN 70 milligrams/deciliter      CAPILLARY BLOOD GLUCOSE      POCT Blood Glucose.: 217 mg/dL (07 Apr 2020 11:32)  POCT Blood Glucose.: 133 mg/dL (07 Apr 2020 08:15)  POCT Blood Glucose.: 112 mg/dL (06 Apr 2020 21:48)  POCT Blood Glucose.: 155 mg/dL (06 Apr 2020 17:03)    I&O's Summary    06 Apr 2020 07:01  -  07 Apr 2020 07:00  --------------------------------------------------------  IN: 780 mL / OUT: 1000 mL / NET: -220 mL    07 Apr 2020 07:01  -  07 Apr 2020 11:48  --------------------------------------------------------  IN: 240 mL / OUT: 0 mL / NET: 240 mL        PHYSICAL EXAM:  Vital Signs Last 24 Hrs  T(C): 36.8 (07 Apr 2020 11:11), Max: 36.8 (06 Apr 2020 21:45)  T(F): 98.2 (07 Apr 2020 11:11), Max: 98.3 (06 Apr 2020 21:45)  HR: 86 (07 Apr 2020 11:11) (65 - 92)  BP: 112/74 (07 Apr 2020 11:11) (102/64 - 143/78)  BP(mean): --  RR: 18 (07 Apr 2020 11:11) (18 - 20)  SpO2: 92% (07 Apr 2020 11:11) (86% - 100%)    CONSTITUTIONAL: NAD  RESPIRATORY: Normal respiratory effort; not using accessory muscles  CARDIOVASCULAR: No lower extremity edema; Peripheral pulses are 2+ bilaterally  ABDOMEN: Nontender to palpation, normoactive bowel sounds, no rebound/guarding; No hepatosplenomegaly  MUSCLOSKELETAL: no clubbing or cyanosis of digits; no joint swelling or tenderness to palpation  PSYCH: A+O to person, place, and time; affect appropriate    LABS:                        12.7   8.91  )-----------( 298      ( 07 Apr 2020 07:36 )             39.2     04-07    136  |  99  |  10  ----------------------------<  134<H>  3.9   |  24  |  0.80    Ca    9.0      07 Apr 2020 07:36  Phos  3.6     04-07  Mg     2.4     04-07    TPro  6.7  /  Alb  3.2<L>  /  TBili  0.7  /  DBili  x   /  AST  63<H>  /  ALT  72<H>  /  AlkPhos  61  04-07                RADIOLOGY & ADDITIONAL TESTS:  Results Reviewed:   Imaging Personally Reviewed:  Electrocardiogram Personally Reviewed:    COORDINATION OF CARE:  Care Discussed with Consultants/Other Providers [Y/N]:  Prior or Outpatient Records Reviewed [Y/N]:

## 2020-04-07 NOTE — PROGRESS NOTE ADULT - ATTENDING COMMENTS
Plan to d/c home tomorrow
59 year old  with DM, HTN admitted with COVID pneumonia.     Currently on 4L NC, saturating at 95%.    Completed 5 day course of plaquenil. Monitor closely, wean as tolerated.
59 year old  with DM, HTN admitted with COVID pneumonia.     Currently on 9L NC, saturating at 96%.    Robitussin standing dose added for persistent cough .  Completed 5 day course of plaquenil. Monitor closely, wean as tolerated.
59 year old male with PMH of CAD s/p PCI , DM and HTN admitted with COVID 19 infection     Currently saturating at 96% on 2L NC. Still spiking fevers     PLAN  will closely monitor respiratory status  Continue Hydroxychloroquine, vitamin c and zinc   Rest of the management as above
Continues to spike. CRP higher. Respiratory status stable. Monitor.
Persistent severe coughing. Still requiring 3L. Continue to monitor.
58 yo with DM, HTN aw COVID pneumonia. Currently on 6L NC. CRP increasing but respiratory status stable. Monitor closely, wean as tolerated.
59 year old male with PMH of CAD s/p PCI , DM and HTN admitted with COVID 19 infection   Currently saturating at 96% on 3L NC.     PLAN   will closely monitor respiratory status and titrate up as needed   Continue Hydroxychlorquine per protocol
Weaning NC. Off plaquenil. PT consulted

## 2020-04-07 NOTE — PROGRESS NOTE ADULT - PROBLEM SELECTOR PLAN 1
2/2 to COVID infection  - was on 3L NC, now weaned to RA saturating 92% without coughing fits overnight  - will try to ambulate today   - symptomatic treatment w/ albuterol inhaler, tessalon, robitussin and hycodan  - s/p hydroxychloroquine load and maintenance  Plan for d/c tomorrow as long as not hypoxic

## 2020-04-07 NOTE — PROGRESS NOTE ADULT - PROBLEM SELECTOR PLAN 6
glucose initially 300s on BMP, FS relatively well controlled. A1c 7.6. CC diet. C/w HISS.  At home on metformin ER 750mg daily

## 2020-04-07 NOTE — PROGRESS NOTE ADULT - PROBLEM SELECTOR PLAN 7
GOC: Full code  Diet: CC  DVT prophylaxis: lovenox  Dispo: PT note appreciated, no skilled needs stable  c/w flomax

## 2020-04-07 NOTE — PROGRESS NOTE ADULT - PROBLEM SELECTOR PLAN 4
stents 3 years ago, c/w aspirin and statin   - chest pain resolved. Non ischemic EKG. Trops wnl.  d/w pharmacy - never filled ticagrelor stents 3 years ago, c/w aspirin and statin   - chest pain resolved. Non ischemic EKG. Trops wnl.  d/w wife - patient also takes Brilinta - will restart stents 3 years ago, c/w aspirin and statin   - chest pain resolved. Non ischemic EKG. Trops wnl.

## 2020-04-07 NOTE — PROGRESS NOTE ADULT - PROBLEM SELECTOR PLAN 3
with bandemia to 7% on presentation, s/p CTX and zithro x 1 in ED as CXR infiltrate unilateral; however, since COVID+, less likely bacterial PNA. No longer meets sepsis criteria   - avoid IVF to prevent ARDS  - hold abx at this time

## 2020-04-07 NOTE — PROGRESS NOTE ADULT - ASSESSMENT
59M PMH CAD s/p SUBHA x 2 (2017), T2DM, HTN, and HLD p/w cough, SOB, and fever, admitted with sepsis and hypoxic resp failure due to COVID19 infection, now improving, weaning supplemental oxygen.

## 2020-04-07 NOTE — PROGRESS NOTE ADULT - PROBLEM SELECTOR PLAN 8
Problem: Discharge planning issues. Plan; Transitions of Care Status:  1. Name of PCP: Dr. Crowe  2. PCP contacted on Admission: [X]Y []N  3. PCP contacted at Discharge: []Y []N  4. Post-Discharge Appointment Date and Location:   5. Summary of Handoff given to PCP: GOC: Full code  Diet: CC  DVT prophylaxis: lovenox  Dispo: PT note appreciated, no skilled needs

## 2020-04-08 ENCOUNTER — TRANSCRIPTION ENCOUNTER (OUTPATIENT)
Age: 60
End: 2020-04-08

## 2020-04-08 VITALS
HEART RATE: 80 BPM | OXYGEN SATURATION: 96 % | SYSTOLIC BLOOD PRESSURE: 129 MMHG | DIASTOLIC BLOOD PRESSURE: 84 MMHG | RESPIRATION RATE: 20 BRPM | TEMPERATURE: 99 F

## 2020-04-08 LAB — GLUCOSE BLDC GLUCOMTR-MCNC: 134 MG/DL — HIGH (ref 70–99)

## 2020-04-08 PROCEDURE — 82330 ASSAY OF CALCIUM: CPT

## 2020-04-08 PROCEDURE — 83615 LACTATE (LD) (LDH) ENZYME: CPT

## 2020-04-08 PROCEDURE — 85027 COMPLETE CBC AUTOMATED: CPT

## 2020-04-08 PROCEDURE — 99285 EMERGENCY DEPT VISIT HI MDM: CPT | Mod: 25

## 2020-04-08 PROCEDURE — 85730 THROMBOPLASTIN TIME PARTIAL: CPT

## 2020-04-08 PROCEDURE — 83880 ASSAY OF NATRIURETIC PEPTIDE: CPT

## 2020-04-08 PROCEDURE — 84145 PROCALCITONIN (PCT): CPT

## 2020-04-08 PROCEDURE — 82962 GLUCOSE BLOOD TEST: CPT

## 2020-04-08 PROCEDURE — 96374 THER/PROPH/DIAG INJ IV PUSH: CPT

## 2020-04-08 PROCEDURE — 82553 CREATINE MB FRACTION: CPT

## 2020-04-08 PROCEDURE — 82435 ASSAY OF BLOOD CHLORIDE: CPT

## 2020-04-08 PROCEDURE — 87635 SARS-COV-2 COVID-19 AMP PRB: CPT

## 2020-04-08 PROCEDURE — 83605 ASSAY OF LACTIC ACID: CPT

## 2020-04-08 PROCEDURE — 85610 PROTHROMBIN TIME: CPT

## 2020-04-08 PROCEDURE — 94640 AIRWAY INHALATION TREATMENT: CPT

## 2020-04-08 PROCEDURE — 86140 C-REACTIVE PROTEIN: CPT

## 2020-04-08 PROCEDURE — 84484 ASSAY OF TROPONIN QUANT: CPT

## 2020-04-08 PROCEDURE — 83036 HEMOGLOBIN GLYCOSYLATED A1C: CPT

## 2020-04-08 PROCEDURE — 82947 ASSAY GLUCOSE BLOOD QUANT: CPT

## 2020-04-08 PROCEDURE — 99239 HOSP IP/OBS DSCHRG MGMT >30: CPT

## 2020-04-08 PROCEDURE — 84132 ASSAY OF SERUM POTASSIUM: CPT

## 2020-04-08 PROCEDURE — 71045 X-RAY EXAM CHEST 1 VIEW: CPT

## 2020-04-08 PROCEDURE — 85385 FIBRINOGEN ANTIGEN: CPT

## 2020-04-08 PROCEDURE — 85379 FIBRIN DEGRADATION QUANT: CPT

## 2020-04-08 PROCEDURE — 97112 NEUROMUSCULAR REEDUCATION: CPT

## 2020-04-08 PROCEDURE — 82550 ASSAY OF CK (CPK): CPT

## 2020-04-08 PROCEDURE — 87086 URINE CULTURE/COLONY COUNT: CPT

## 2020-04-08 PROCEDURE — 85014 HEMATOCRIT: CPT

## 2020-04-08 PROCEDURE — 97161 PT EVAL LOW COMPLEX 20 MIN: CPT

## 2020-04-08 PROCEDURE — 93005 ELECTROCARDIOGRAM TRACING: CPT

## 2020-04-08 PROCEDURE — 81001 URINALYSIS AUTO W/SCOPE: CPT

## 2020-04-08 PROCEDURE — 83735 ASSAY OF MAGNESIUM: CPT

## 2020-04-08 PROCEDURE — 84295 ASSAY OF SERUM SODIUM: CPT

## 2020-04-08 PROCEDURE — 82728 ASSAY OF FERRITIN: CPT

## 2020-04-08 PROCEDURE — 82803 BLOOD GASES ANY COMBINATION: CPT

## 2020-04-08 PROCEDURE — 97530 THERAPEUTIC ACTIVITIES: CPT

## 2020-04-08 PROCEDURE — 85652 RBC SED RATE AUTOMATED: CPT

## 2020-04-08 PROCEDURE — 80048 BASIC METABOLIC PNL TOTAL CA: CPT

## 2020-04-08 PROCEDURE — 84100 ASSAY OF PHOSPHORUS: CPT

## 2020-04-08 PROCEDURE — 80053 COMPREHEN METABOLIC PANEL: CPT

## 2020-04-08 RX ORDER — LIDOCAINE 4 G/100G
1 CREAM TOPICAL
Qty: 5 | Refills: 0
Start: 2020-04-08

## 2020-04-08 RX ORDER — ALBUTEROL 90 UG/1
2 AEROSOL, METERED ORAL
Qty: 1 | Refills: 0
Start: 2020-04-08 | End: 2020-05-07

## 2020-04-08 RX ORDER — ACETAMINOPHEN 500 MG
2 TABLET ORAL
Qty: 80 | Refills: 0
Start: 2020-04-08 | End: 2020-04-17

## 2020-04-08 RX ORDER — SODIUM CHLORIDE 0.65 %
1 AEROSOL, SPRAY (ML) NASAL
Refills: 0 | Status: DISCONTINUED | OUTPATIENT
Start: 2020-04-08 | End: 2020-04-08

## 2020-04-08 RX ADMIN — Medication 100 MILLIGRAM(S): at 11:51

## 2020-04-08 RX ADMIN — Medication 25 MILLIGRAM(S): at 05:07

## 2020-04-08 RX ADMIN — Medication 200 MILLIGRAM(S): at 05:08

## 2020-04-08 RX ADMIN — MONTELUKAST 10 MILLIGRAM(S): 4 TABLET, CHEWABLE ORAL at 11:51

## 2020-04-08 RX ADMIN — Medication 81 MILLIGRAM(S): at 11:51

## 2020-04-08 RX ADMIN — TICAGRELOR 90 MILLIGRAM(S): 90 TABLET ORAL at 05:07

## 2020-04-08 RX ADMIN — ENOXAPARIN SODIUM 40 MILLIGRAM(S): 100 INJECTION SUBCUTANEOUS at 12:15

## 2020-04-08 RX ADMIN — Medication 1 MILLIGRAM(S): at 11:51

## 2020-04-08 NOTE — PROGRESS NOTE ADULT - PROBLEM SELECTOR PROBLEM 5
Discharge planning issues
HTN (hypertension)

## 2020-04-08 NOTE — PROGRESS NOTE ADULT - PROBLEM SELECTOR PROBLEM 4
BPH (benign prostatic hyperplasia)
CAD (coronary artery disease)

## 2020-04-08 NOTE — PROGRESS NOTE ADULT - PROVIDER SPECIALTY LIST ADULT
Hospitalist
Internal Medicine

## 2020-04-08 NOTE — PROGRESS NOTE ADULT - PROBLEM SELECTOR PLAN 5
- no on medications at home. If BP becomes significantly elevated, may need treatment.
- not on medications at home. If BP becomes significantly elevated, may need treatment.
- on lisinopril 2.5mg at home as well as metoprolol XL 25mg daily  Will restart once BP improves
--no on medications at home. If BP becomes significantly elevated, may need treatment.
-no on medications at home. If BP becomes significantly elevated, may need treatment.
-no on medications at home. If BP becomes significantly elevated, may need treatment.
Problem: Discharge planning issues. Plan; Transitions of Care Status:  1. Name of PCP: Dr. Crowe  2. PCP contacted on Admission: [X]Y []N  3. PCP contacted at Discharge: []Y []N  4. Post-Discharge Appointment Date and Location:   5. Summary of Handoff given to PCP:    D/C home today. 45 minutes spent discharging the patient
--no on medications at home. If BP becomes significantly elevated, may need treatment.

## 2020-04-08 NOTE — PROGRESS NOTE ADULT - REASON FOR ADMISSION
cough, SOB

## 2020-04-08 NOTE — PROGRESS NOTE ADULT - SUBJECTIVE AND OBJECTIVE BOX
Colton Tracey MD  Pager [phone]  [facility] [phone]  Cell Phone [phone]    Patient is a 59y old  Male who presents with a chief complaint of cough, SOB (07 Apr 2020 11:48)        SUBJECTIVE / OVERNIGHT EVENTS: Patient feels well. Denies SOB      MEDICATIONS  (STANDING):  aspirin enteric coated 81 milliGRAM(s) Oral daily  atorvastatin 40 milliGRAM(s) Oral at bedtime  benzonatate 200 milliGRAM(s) Oral three times a day  dextrose 5%. 1000 milliLiter(s) (50 mL/Hr) IV Continuous <Continuous>  dextrose 50% Injectable 12.5 Gram(s) IV Push once  dextrose 50% Injectable 25 Gram(s) IV Push once  dextrose 50% Injectable 25 Gram(s) IV Push once  enoxaparin Injectable 40 milliGRAM(s) SubCutaneous daily  folic acid 1 milliGRAM(s) Oral daily  guaiFENesin   Syrup  (Sugar-Free) 100 milliGRAM(s) Oral every 4 hours  hydrocodone/homatropine Syrup 5 milliLiter(s) Oral three times a day  insulin lispro (HumaLOG) corrective regimen sliding scale   SubCutaneous three times a day before meals  insulin lispro (HumaLOG) corrective regimen sliding scale   SubCutaneous at bedtime  metoprolol succinate ER 25 milliGRAM(s) Oral daily  montelukast 10 milliGRAM(s) Oral daily  tamsulosin 0.4 milliGRAM(s) Oral at bedtime  ticagrelor 90 milliGRAM(s) Oral every 12 hours    MEDICATIONS  (PRN):  acetaminophen   Tablet .. 650 milliGRAM(s) Oral every 4 hours PRN Temp greater or equal to 38.5C (101.3F)  ALBUTerol    90 MICROgram(s) HFA Inhaler 2 Puff(s) Inhalation every 4 hours PRN Shortness of Breath and/or Wheezing  dextrose 40% Gel 15 Gram(s) Oral once PRN Blood Glucose LESS THAN 70 milliGRAM(s)/deciliter  glucagon  Injectable 1 milliGRAM(s) IntraMuscular once PRN Glucose LESS THAN 70 milligrams/deciliter      Vital Signs Last 24 Hrs  T(C): 36.5 (08 Apr 2020 08:00), Max: 37.1 (07 Apr 2020 20:00)  T(F): 97.7 (08 Apr 2020 08:00), Max: 98.8 (07 Apr 2020 20:00)  HR: 80 (08 Apr 2020 08:00) (72 - 89)  BP: 131/89 (08 Apr 2020 08:00) (112/74 - 131/89)  BP(mean): --  RR: 20 (08 Apr 2020 08:00) (18 - 20)  SpO2: 94% (08 Apr 2020 08:00) (90% - 94%)  CAPILLARY BLOOD GLUCOSE      POCT Blood Glucose.: 134 mg/dL (08 Apr 2020 08:40)  POCT Blood Glucose.: 139 mg/dL (07 Apr 2020 21:53)  POCT Blood Glucose.: 152 mg/dL (07 Apr 2020 17:57)  POCT Blood Glucose.: 217 mg/dL (07 Apr 2020 11:32)    I&O's Summary    07 Apr 2020 07:01  -  08 Apr 2020 07:00  --------------------------------------------------------  IN: 660 mL / OUT: 700 mL / NET: -40 mL    08 Apr 2020 07:01  -  08 Apr 2020 10:45  --------------------------------------------------------  IN: 240 mL / OUT: 300 mL / NET: -60 mL          PHYSICAL EXAM  GENERAL: NAD, well-developed  HEAD:  Atraumatic, Normocephalic  EYES: EOMI, PERRLA, conjunctiva and sclera clear  CHEST/LUNG: Clear to auscultation bilaterally; No wheeze  HEART: Regular rate and rhythm; No murmurs, rubs, or gallops  ABDOMEN: Soft, Nontender, Nondistended; Bowel sounds present  EXTREMITIES:  2+ Peripheral Pulses, No clubbing, cyanosis, or edema  PSYCH: AAOx3      LABS:                        12.7   8.91  )-----------( 298      ( 07 Apr 2020 07:36 )             39.2     04-07    136  |  99  |  10  ----------------------------<  134<H>  3.9   |  24  |  0.80    Ca    9.0      07 Apr 2020 07:36  Phos  3.6     04-07  Mg     2.4     04-07    TPro  6.7  /  Alb  3.2<L>  /  TBili  0.7  /  DBili  x   /  AST  63<H>  /  ALT  72<H>  /  AlkPhos  61  04-07                RADIOLOGY & ADDITIONAL TESTS:    Imaging Personally Reviewed:  Consultant(s) Notes Reviewed:    Care Discussed with Consultants/Other Providers:

## 2020-04-08 NOTE — DISCHARGE NOTE NURSING/CASE MANAGEMENT/SOCIAL WORK - NSDCFUADDAPPT_GEN_ALL_CORE_FT
** IF YOU DO NOT HAVE AN ESTABLISHED PCP, please call [phone] for follow-up  [employer] Teams will be following up with you outpatient as well

## 2020-04-08 NOTE — PROGRESS NOTE ADULT - PROBLEM SELECTOR PROBLEM 3
Diabetes mellitus of other type without complication
Sepsis

## 2020-04-08 NOTE — PROGRESS NOTE ADULT - PROBLEM SELECTOR PROBLEM 2
CAD (coronary artery disease)
Pneumonia due to 2019 novel coronavirus

## 2020-04-08 NOTE — PROGRESS NOTE ADULT - ASSESSMENT
59M PMH CAD s/p SUBHA x 2 (2017), T2DM, HTN, and HLD p/w cough, SOB, and fever, admitted with sepsis and hypoxic resp failure due to COVID19 infection, now improved, off supplemental oxygen.

## 2020-04-08 NOTE — DISCHARGE NOTE NURSING/CASE MANAGEMENT/SOCIAL WORK - PATIENT PORTAL LINK FT
You can access the FollowMyHealth Patient Portal offered by [facility] by registering at the following website: [email or web address]. By joining [employer]’s FollowMyHealth portal, you will also be able to view your health information using other applications (apps) compatible with our system.

## 2021-04-10 ENCOUNTER — EMERGENCY (EMERGENCY)
Facility: HOSPITAL | Age: 61
LOS: 1 days | Discharge: HOME | End: 2021-04-10
Attending: EMERGENCY MEDICINE
Payer: COMMERCIAL

## 2021-04-10 VITALS
SYSTOLIC BLOOD PRESSURE: 144 MMHG | HEIGHT: 64 IN | OXYGEN SATURATION: 95 % | DIASTOLIC BLOOD PRESSURE: 84 MMHG | RESPIRATION RATE: 20 BRPM | WEIGHT: 147.05 LBS | HEART RATE: 75 BPM

## 2021-04-10 VITALS
RESPIRATION RATE: 20 BRPM | HEART RATE: 73 BPM | OXYGEN SATURATION: 97 % | TEMPERATURE: 98 F | DIASTOLIC BLOOD PRESSURE: 70 MMHG | SYSTOLIC BLOOD PRESSURE: 102 MMHG

## 2021-04-10 DIAGNOSIS — Z98.890 OTHER SPECIFIED POSTPROCEDURAL STATES: Chronic | ICD-10-CM

## 2021-04-10 DIAGNOSIS — Z41.9 ENCOUNTER FOR PROCEDURE FOR PURPOSES OTHER THAN REMEDYING HEALTH STATE, UNSPECIFIED: Chronic | ICD-10-CM

## 2021-04-10 PROBLEM — I10 ESSENTIAL (PRIMARY) HYPERTENSION: Chronic | Status: ACTIVE | Noted: 2020-03-29

## 2021-04-10 PROBLEM — I25.10 ATHEROSCLEROTIC HEART DISEASE OF NATIVE CORONARY ARTERY WITHOUT ANGINA PECTORIS: Chronic | Status: ACTIVE | Noted: 2020-03-29

## 2021-04-10 PROBLEM — R73.03 PREDIABETES: Chronic | Status: ACTIVE | Noted: 2020-03-30

## 2021-04-10 PROCEDURE — 70450 CT HEAD/BRAIN W/O DYE: CPT | Mod: 26,MA

## 2021-04-10 PROCEDURE — 99284 EMERGENCY DEPT VISIT MOD MDM: CPT | Mod: 25

## 2021-04-10 PROCEDURE — 99284 EMERGENCY DEPT VISIT MOD MDM: CPT

## 2021-04-10 PROCEDURE — 70450 CT HEAD/BRAIN W/O DYE: CPT

## 2021-04-10 NOTE — ED ADULT NURSE NOTE - OBJECTIVE STATEMENT
Patient presents to Ed with c/o head injury. Patient reports he walked into a glass door injuring his head.  Patient A&Ox3 takes aspirin and Brilinta, denies loc +bruising to rt side of forehead denies visual changes.  Patient awaiting Ct scan.

## 2021-04-10 NOTE — ED PROVIDER NOTE - PHYSICAL EXAMINATION
Attn - alert, NAD, no pallor or jaundice, PERRL 3 mm, tender swelling R medial orbital rim with ecchymosis, EOMI, moist mm, skin - warm and dry, Lungs - clear, no w/r/r, good BS bilaterally, Cor - rr, no M, no rub, Abdo - ND, soft, NT, no HSM, no CVAT, no guarding or rebound. Extremities - no edema, no calf tenderness, distal pulses intact and symmetrical, Neuro - intact and non-focal

## 2021-04-10 NOTE — ED PROVIDER NOTE - OBJECTIVE STATEMENT
Attn - pt seen in G6 - pt hit R forehead on glass door 5:30 pm yesterday and developed swelling and ecchymosis about R orbit. pt called PMD and advised to go to ER for Head CT - pt is on ASA and Brilinta for CAD.  NO: loc, h/a, dizziness, focal neuro, weakness, numbness, n/v. neck pain or change in vision.

## 2021-04-10 NOTE — ED ADULT NURSE NOTE - PSH
Elective surgical procedure  green light procedure for the prostate  History of prostate surgery

## 2021-04-10 NOTE — ED PROVIDER NOTE - PATIENT PORTAL LINK FT
You can access the FollowMyHealth Patient Portal offered by [facility] by registering at the following website: [email or web address]. By joining [email or web address]’s FollowMyHealth portal, you will also be able to view your health information using other applications (apps) compatible with our system.

## 2021-04-10 NOTE — ED PROVIDER NOTE - PMH
BPH (benign prostatic hyperplasia)    CAD (coronary artery disease)  SUBHA x 2 2017  DM (diabetes mellitus)    HTN (hypertension)    Prediabetes

## 2021-04-10 NOTE — ED PROVIDER NOTE - CLINICAL SUMMARY MEDICAL DECISION MAKING FREE TEXT BOX
Attn - pt on ASA and Brilinta for CAD, hit head yesterday and developed swelling and ecchymosis and pmd sent to ed for CT head.  CT.

## 2021-08-08 NOTE — PHYSICAL THERAPY INITIAL EVALUATION ADULT - SOCIAL CONCERNS
Infusion Nursing Note:  Dhruv Villalba presents today for 1 unit of platelets.    Patient seen by provider today: No   present during visit today: Not Applicable.    Note: N/A.      Intravenous Access:  Peripheral IV placed. Left in place for tomorrow's infusion per patient preference. Patient also expressed interest in having PICC line placed so in basket message sent to Dr. Beatty.     Treatment Conditions:  Lab Results   Component Value Date    HGB 8.2 08/08/2021    HGB 9.7 07/10/2021     Lab Results   Component Value Date    WBC 1.1 08/08/2021    WBC 2.7 07/10/2021      Lab Results   Component Value Date    ANEU 0.5 08/07/2021    ANEU 0.6 07/10/2021     Lab Results   Component Value Date    PLT 24 08/08/2021    PLT 22 07/10/2021      Results reviewed, labs MET treatment parameters, ok to proceed with treatment.  Blood transfusion consent signed 6/17/21.      Post Infusion Assessment:  Patient tolerated infusion without incident.  Blood return noted pre and post infusion.  Site patent and intact, free from redness, edema or discomfort.  No evidence of extravasations.  Access discontinued per protocol.       Discharge Plan:   Patient declined prescription refills.  Discharge instructions reviewed with: Patient.  Patient verbalized understanding of discharge instructions and all questions answered.  AVS to patient via [employer].  Patient will return 8/9/21 for next appointment.   Patient discharged in stable condition accompanied by: self.  Departure Mode: Ambulatory.      Fozia Morris RN                       None

## 2022-02-08 ENCOUNTER — RESULT REVIEW (OUTPATIENT)
Age: 62
End: 2022-02-08

## 2022-03-14 NOTE — H&P ADULT - PROBLEM SELECTOR PLAN 6
-Recommend seeing dermatology annually, referral as below   --glucose initially 300s on BMP, . Suspect has overt T2DM. CC diet. C/w HISS.

## 2022-07-03 ENCOUNTER — EMERGENCY (EMERGENCY)
Facility: HOSPITAL | Age: 62
LOS: 1 days | Discharge: HOME | End: 2022-07-03
Attending: STUDENT IN AN ORGANIZED HEALTH CARE EDUCATION/TRAINING PROGRAM
Payer: COMMERCIAL

## 2022-07-03 VITALS
SYSTOLIC BLOOD PRESSURE: 168 MMHG | HEART RATE: 65 BPM | OXYGEN SATURATION: 96 % | TEMPERATURE: 98 F | RESPIRATION RATE: 16 BRPM | DIASTOLIC BLOOD PRESSURE: 92 MMHG

## 2022-07-03 VITALS
OXYGEN SATURATION: 95 % | HEIGHT: 64 IN | SYSTOLIC BLOOD PRESSURE: 159 MMHG | TEMPERATURE: 98 F | RESPIRATION RATE: 16 BRPM | WEIGHT: 151.9 LBS | HEART RATE: 77 BPM | DIASTOLIC BLOOD PRESSURE: 92 MMHG

## 2022-07-03 DIAGNOSIS — Z98.890 OTHER SPECIFIED POSTPROCEDURAL STATES: Chronic | ICD-10-CM

## 2022-07-03 DIAGNOSIS — Z41.9 ENCOUNTER FOR PROCEDURE FOR PURPOSES OTHER THAN REMEDYING HEALTH STATE, UNSPECIFIED: Chronic | ICD-10-CM

## 2022-07-03 LAB
ALBUMIN SERPL ELPH-MCNC: 4.9 G/DL — SIGNIFICANT CHANGE UP (ref 3.3–5)
ALP SERPL-CCNC: 97 U/L — SIGNIFICANT CHANGE UP (ref 40–120)
ALT FLD-CCNC: 25 U/L — SIGNIFICANT CHANGE UP (ref 10–45)
ANION GAP SERPL CALC-SCNC: 13 MMOL/L — SIGNIFICANT CHANGE UP (ref 5–17)
AST SERPL-CCNC: 22 U/L — SIGNIFICANT CHANGE UP (ref 10–40)
BASOPHILS # BLD AUTO: 0.02 K/UL — SIGNIFICANT CHANGE UP (ref 0–0.2)
BASOPHILS NFR BLD AUTO: 0.2 % — SIGNIFICANT CHANGE UP (ref 0–2)
BILIRUB SERPL-MCNC: 0.6 MG/DL — SIGNIFICANT CHANGE UP (ref 0.2–1.2)
BUN SERPL-MCNC: 18 MG/DL — SIGNIFICANT CHANGE UP (ref 7–23)
CALCIUM SERPL-MCNC: 9.7 MG/DL — SIGNIFICANT CHANGE UP (ref 8.4–10.5)
CHLORIDE SERPL-SCNC: 97 MMOL/L — SIGNIFICANT CHANGE UP (ref 96–108)
CO2 SERPL-SCNC: 24 MMOL/L — SIGNIFICANT CHANGE UP (ref 22–31)
CREAT SERPL-MCNC: 1 MG/DL — SIGNIFICANT CHANGE UP (ref 0.5–1.3)
EGFR: 86 ML/MIN/1.73M2 — SIGNIFICANT CHANGE UP
EOSINOPHIL # BLD AUTO: 0 K/UL — SIGNIFICANT CHANGE UP (ref 0–0.5)
EOSINOPHIL NFR BLD AUTO: 0 % — SIGNIFICANT CHANGE UP (ref 0–6)
GLUCOSE SERPL-MCNC: 262 MG/DL — HIGH (ref 70–99)
HCT VFR BLD CALC: 43.8 % — SIGNIFICANT CHANGE UP (ref 39–50)
HGB BLD-MCNC: 14.7 G/DL — SIGNIFICANT CHANGE UP (ref 13–17)
IMM GRANULOCYTES NFR BLD AUTO: 1 % — SIGNIFICANT CHANGE UP (ref 0–1.5)
LYMPHOCYTES # BLD AUTO: 1.03 K/UL — SIGNIFICANT CHANGE UP (ref 1–3.3)
LYMPHOCYTES # BLD AUTO: 9.5 % — LOW (ref 13–44)
MCHC RBC-ENTMCNC: 28.8 PG — SIGNIFICANT CHANGE UP (ref 27–34)
MCHC RBC-ENTMCNC: 33.6 GM/DL — SIGNIFICANT CHANGE UP (ref 32–36)
MCV RBC AUTO: 85.7 FL — SIGNIFICANT CHANGE UP (ref 80–100)
MONOCYTES # BLD AUTO: 0.12 K/UL — SIGNIFICANT CHANGE UP (ref 0–0.9)
MONOCYTES NFR BLD AUTO: 1.1 % — LOW (ref 2–14)
NEUTROPHILS # BLD AUTO: 9.59 K/UL — HIGH (ref 1.8–7.4)
NEUTROPHILS NFR BLD AUTO: 88.2 % — HIGH (ref 43–77)
NRBC # BLD: 0 /100 WBCS — SIGNIFICANT CHANGE UP (ref 0–0)
PLATELET # BLD AUTO: 225 K/UL — SIGNIFICANT CHANGE UP (ref 150–400)
POTASSIUM SERPL-MCNC: 5 MMOL/L — SIGNIFICANT CHANGE UP (ref 3.5–5.3)
POTASSIUM SERPL-SCNC: 5 MMOL/L — SIGNIFICANT CHANGE UP (ref 3.5–5.3)
PROT SERPL-MCNC: 7.7 G/DL — SIGNIFICANT CHANGE UP (ref 6–8.3)
RBC # BLD: 5.11 M/UL — SIGNIFICANT CHANGE UP (ref 4.2–5.8)
RBC # FLD: 12.6 % — SIGNIFICANT CHANGE UP (ref 10.3–14.5)
SODIUM SERPL-SCNC: 134 MMOL/L — LOW (ref 135–145)
WBC # BLD: 10.87 K/UL — HIGH (ref 3.8–10.5)
WBC # FLD AUTO: 10.87 K/UL — HIGH (ref 3.8–10.5)

## 2022-07-03 PROCEDURE — 36415 COLL VENOUS BLD VENIPUNCTURE: CPT

## 2022-07-03 PROCEDURE — 99284 EMERGENCY DEPT VISIT MOD MDM: CPT | Mod: 25

## 2022-07-03 PROCEDURE — 80053 COMPREHEN METABOLIC PANEL: CPT

## 2022-07-03 PROCEDURE — 99285 EMERGENCY DEPT VISIT HI MDM: CPT

## 2022-07-03 PROCEDURE — 70450 CT HEAD/BRAIN W/O DYE: CPT | Mod: 26,MA

## 2022-07-03 PROCEDURE — 85025 COMPLETE CBC W/AUTO DIFF WBC: CPT

## 2022-07-03 PROCEDURE — 70450 CT HEAD/BRAIN W/O DYE: CPT | Mod: MA

## 2022-07-03 RX ORDER — VALACYCLOVIR 500 MG/1
1 TABLET, FILM COATED ORAL
Qty: 21 | Refills: 0
Start: 2022-07-03 | End: 2022-07-09

## 2022-07-03 RX ORDER — ACETAMINOPHEN 500 MG
975 TABLET ORAL ONCE
Refills: 0 | Status: COMPLETED | OUTPATIENT
Start: 2022-07-03 | End: 2022-07-03

## 2022-07-03 RX ADMIN — Medication 2 DROP(S): at 17:21

## 2022-07-03 RX ADMIN — Medication 975 MILLIGRAM(S): at 16:49

## 2022-07-03 NOTE — ED PROVIDER NOTE - CLINICAL SUMMARY MEDICAL DECISION MAKING FREE TEXT BOX
pt recently treated for shingles, R sided on the neck, vesicles crusted over at this time. completed course of valtrex. now 3 days progressively worsening ipsilateral facial weakness/droop. given prednisone 40mg and referred to the ED by PMD today for further w/u. exam consistent with bells palsy, likely peripheral. no veiscles in the ear canal. head CT, labs and supportive care. reassess. AD

## 2022-07-03 NOTE — ED PROVIDER NOTE - NS ED ATTENDING STATEMENT MOD
I have seen and examined this patient and fully participated in the care of this patient as the teaching attending.  The service was shared with the PARVIN.  I reviewed and verified the documentation and independently performed the documented: Attending with

## 2022-07-03 NOTE — ED ADULT TRIAGE NOTE - CHIEF COMPLAINT QUOTE
diagnosed with shingles on R side of neck and face on 6/23, started on valtrex, gabapentin and calamine lotion. shingles was improving until 3 days ago when pt noticed he could not move the R side of his face and had a teary R eye. pt went back to his PCP (Dr. Taylor Crowe) who consulted neuro and started him on a prednisone taper. symptoms have not improved and PCP advised ED for a more comprehensive neuro workup.

## 2022-07-03 NOTE — ED PROVIDER NOTE - ATTENDING CONTRIBUTION TO CARE
I, Concepcion Candelario, performed a history and physical exam of the patient and discussed their management with the resident and /or advanced care provider. I reviewed the resident and /or ACP's note and agree with the documented findings and plan of care. I was present and available for all procedures.

## 2022-07-03 NOTE — ED PROVIDER NOTE - NSFOLLOWUPINSTRUCTIONS_ED_ALL_ED_FT
Your results are attached.    Please follow up with your neurologist and primary care doctor. Take all medications as prescribed to you. Take the prednisone and valtrex prescribed to you today. Use the eyedrops given to you today to prevent damage to your eyes.     Please return to the emergency department with any new or worsening symptoms, including but not limited to:  -HIgh fevers  -Slurred speech  -Headache  -Vision changes  -You are confused or faint  -Numbness, tingling, weakness in arms or legs      Headache/Migraine  Fei Manriquez MD  Director, Headache Center  [facility]  [facility]  Neuro[facility] [place] [facility] [place]  [address], [address]  [place] [address]  [phone]    [place] Palsy    WHAT YOU NEED TO KNOW:  Bell palsy is a sudden weakness or paralysis of one side of your face. Bell palsy occurs when the nerve that controls the muscles in your face becomes swollen or irritated.    DISCHARGE INSTRUCTIONS:    Medicines:  Medicine may be given to decrease swelling and irritation of your facial nerve. You may receive antiviral medicine if your healthcare provider thinks a virus caused your Bell palsy. Your healthcare provider may also suggest acetaminophen or ibuprofen to reduce pain. These medicines are available without a doctor's order. Ask which medicine to take, and how much you need. Follow directions. Acetaminophen can cause liver damage, and ibuprofen can cause stomach bleeding or kidney damage.  Take your medicine as directed. Contact your healthcare provider if you think your medicine is not helping or if you have side effects. Tell him if you are allergic to any medicine. Keep a list of the medicines, vitamins, and herbs you take. Include the amounts, and when and why you take them. Bring the list or the pill bottles to follow-up visits. Carry your medicine list with you in case of an emergency.  Follow up with your healthcare provider as directed: Write down your questions so you remember to ask them during your visits.  Eye care: Your healthcare provider may recommend that you use artificial tears during the day to keep your eye moist. You may need to use an eye ointment at night. You may also need to wear a patch over your eye and tape it shut while you sleep. This helps to keep your eye from getting dry and infected. Wear sunglasses to protect your eye from direct sunlight. Stay away from places that have fumes, dust, or other particles in the air that may harm your eye.  Physical therapy: A physical therapist may teach you how to massage your face and exercise the nerves and muscles in your face. This may help you get better sooner and prevent long-term problems. You can exercise on your own when your facial movement begins to return. Open and close your eye, wink, and smile wide. Do the exercises for 15 or 20 minutes several times a day.  Contact your healthcare provider if:  You have a fever.  Your eye becomes red, irritated, or painful.  You have questions or concerns about your condition or care.  Seek care immediately or call 911 if:  You develop weakness or numbness on one side of your body (other than your face).  You have double vision, or you lose vision in your eye.  You have trouble thinking clearly.

## 2022-07-03 NOTE — ED PROVIDER NOTE - PATIENT PORTAL LINK FT
You can access the FollowMyHealth Patient Portal offered by [facility] by registering at the following website: [email or web address]. By joining [employer]’s FollowMyHealth portal, you will also be able to view your health information using other applications (apps) compatible with our system.

## 2022-07-03 NOTE — ED ADULT NURSE NOTE - NSIMPLEMENTINTERV_GEN_ALL_ED
Implemented All Universal Safety Interventions:  [place] to call system. Call bell, personal items and telephone within reach. Instruct patient to call for assistance. Room bathroom lighting operational. Non-slip footwear when patient is off stretcher. Physically safe environment: no spills, clutter or unnecessary equipment. Stretcher in lowest position, wheels locked, appropriate side rails in place.

## 2022-07-03 NOTE — ED PROVIDER NOTE - NSICDXPASTSURGICALHX_GEN_ALL_CORE_FT
PAST SURGICAL HISTORY:  Elective surgical procedure green light procedure for the prostate    History of prostate surgery

## 2022-07-03 NOTE — ED PROVIDER NOTE - OBJECTIVE STATEMENT
61 year old M with PMH ... referred from neurologist's office for evaluation of facial droop. 61 year old M with PMH DM, HTN, CAD, BPH referred from neurologist's office for evaluation of facial droop. 61 year old M with PMH DM, HTN, CAD, BPH referred from neurologist's office for evaluation of facial droop.      Andree attg: pt 60yo Hx of DM, HTN, CAD, BPH sent by PMD for neurological w/u. pt with R sided shingles on the neck and mastoid process, Dx on 6/23 and has complete 1wk course of valtrex, had uncomplicated recovery. lesions now crusted over. 3 days ago followed with PMD for weakness around the R side of the mouth. pmd spoke with neurology on the phone and prescribed prednisone 40mg. returned to PMD today with now R sided facial droop, inability to close the eye. sent to the ED for further evaluation.

## 2022-07-03 NOTE — ED PROVIDER NOTE - NS ED ROS FT
CONST: no fevers, no chills  EYES: no pain, no vision changes  ENT: no sore throat, no ear pain, no change in hearing, +cannot close R eye  CV: no chest pain, no leg swelling  RESP: no shortness of breath, no cough  ABD: no abdominal pain, no nausea, no vomiting, no diarrhea  : no dysuria, no flank pain, no hematuria  MSK: no back pain, no extremity pain  NEURO: no headache, +facial droop  HEME: no easy bleeding  SKIN:  no rash

## 2022-07-03 NOTE — ED PROVIDER NOTE - PROGRESS NOTE DETAILS
Andree attg: pt with neg head CT, no vesicles in external ear canal. will give additional course of valtrex and increase dose of prednisone to 60mg. will give neurology follow up and have urgent follow up for representative to assist in scheduling an appt. will dc home at this time. supportive care instructions given to pt and wife.

## 2022-07-03 NOTE — ED ADULT NURSE NOTE - OBJECTIVE STATEMENT
pt is a 62yo male presenting to the ED complaining of right sided facial paralysis. pt states he was diagnosed with shingles 12 days ago and has been taking Valtrex, Gabapentin, and calamine lotion as treatment. pt states that he has been unable to move the right side of his face for the last 3 days, outpatient MD prescribed pt prednisone with no relief of facial paralysis. pt was urged to come to ED by outpatient MD for neuro check and workup. upon assessment, right side of pt face appears drooped, pt unable to smile evenly, right eye does not blink, pt having trouble speaking. pt A&Ox3 gross neuro intact, lungs cta bilaterally, no difficulty speaking in complete sentences, s1s2 heart sounds heard, pulses x 4, bonner x4, abdomen soft nontender nondistended. pt denies chest pain, sob, ha, n/v/d, abdominal pain, f/c, urinary symptoms, hematuria.

## 2022-07-03 NOTE — ED PROVIDER NOTE - NSICDXFAMILYHX_GEN_ALL_CORE_FT
FAMILY HISTORY:  No pertinent family history  No pertinent family history in first degree relatives

## 2022-07-03 NOTE — ED PROVIDER NOTE - NSICDXPASTMEDICALHX_GEN_ALL_CORE_FT
PAST MEDICAL HISTORY:  BPH (benign prostatic hyperplasia)     CAD (coronary artery disease) SUBHA x 2 2017    DM (diabetes mellitus)     HTN (hypertension)     Prediabetes

## 2022-07-03 NOTE — ED PROVIDER NOTE - CARE PROVIDER_API CALL
Aakash Stevenson  NEUROLOGY  [address] [place], [place] [address]  Phone: [phone]  Fax: [phone]  Follow Up Time: 4-6 Days

## 2022-07-03 NOTE — ED PROVIDER NOTE - PHYSICAL EXAMINATION
GENERAL: Awake, alert, NAD  HEENT: NC/AT, moist mucous membranes, PERRL, EOMI. R ear - no vesicles in ear canal, or TM.   LUNGS: CTAB, no wheezes or crackles   CARDIAC: RRR, no m/r/g  ABDOMEN: Soft, normal BS, non tender, non distended, no rebound, no guarding  BACK: No midline spinal tenderness, no CVA tenderness  EXT: No edema, no calf tenderness, 2+ DP pulses bilaterally, no deformities.  NEURO: A&Ox3. Moving all extremities.  SKIN: Warm and dry. No rash.  PSYCH: Normal affect. GENERAL: Awake, alert, NAD  HEENT: NC/AT, moist mucous membranes, PERRL, EOMI. R ear - no vesicles in ear canal, or TM. Cannot close R eyelid.   LUNGS: CTAB, no wheezes or crackles   CARDIAC: RRR, no m/r/g  ABDOMEN: Soft, normal BS, non tender, non distended, no rebound, no guarding  BACK: No midline spinal tenderness, no CVA tenderness  EXT: No edema, no calf tenderness, 2+ DP pulses bilaterally, no deformities.  NEURO: A&Ox3. Moving all extremities. R facial droop, cannot raise eyebrows R side. Neuro exam otherwise WNL. No slurred speech.   SKIN: Warm and dry. No rash.  PSYCH: Normal affect. GENERAL: Awake, alert, NAD  HEENT: NC/AT, moist mucous membranes, PERRL, EOMI. R ear - no vesicles in ear canal, or TM. Cannot close R eyelid.   LUNGS: CTAB, no wheezes or crackles   CARDIAC: RRR, no m/r/g  ABDOMEN: Soft, normal BS, non tender, non distended, no rebound, no guarding  BACK: No midline spinal tenderness, no CVA tenderness  EXT: No edema, no calf tenderness, 2+ DP pulses bilaterally, no deformities.  NEURO: A&Ox3. Moving all extremities. R facial droop, cannot raise eyebrow on the R or close eye completely. Neuro exam otherwise WNL. No slurred speech.   SKIN: Warm and dry. No rash.  PSYCH: Normal affect.

## 2022-07-04 RX ORDER — VALACYCLOVIR 500 MG/1
1 TABLET, FILM COATED ORAL
Qty: 21 | Refills: 0
Start: 2022-07-04 | End: 2022-07-10

## 2022-07-04 NOTE — ED POST DISCHARGE NOTE - RESULT SUMMARY
Pt called, States medications were not sent to pharmacy. Chart reviewed. Rx's for prednisone and valtrex listed as "printed", were not e-submitted. Re-sent rx as initially prescribed by primary ED team to pt's preferred pharmacy. - Azam Clement PA-C

## 2022-07-28 NOTE — H&P ADULT. - BMI (KG/M2)
Head,  normocephalic,  atraumatic,  Face,  Face within normal limits,  Ears,  External ears within normal limits
26.8

## 2022-09-28 NOTE — ED ADULT NURSE NOTE - CAS DISCH ACCOMP BY
Hydroxychloroquine Pregnancy And Lactation Text: This medication has been shown to cause fetal harm but it isn't assigned a Pregnancy Risk Category. There are small amounts excreted in breast milk. Self

## 2022-11-14 ENCOUNTER — APPOINTMENT (OUTPATIENT)
Dept: NEUROLOGY | Facility: CLINIC | Age: 62
End: 2022-11-14

## 2022-12-13 NOTE — ED ADULT TRIAGE NOTE - DOMESTIC TRAVEL HIGH RISK QUESTION
BMI Counseling: Body mass index is 30 54 kg/m²  The BMI is above normal  Nutrition recommendations include reducing portion sizes, decreasing overall calorie intake, 3-5 servings of fruits/vegetables daily, reducing fast food intake, consuming healthier snacks, decreasing soda and/or juice intake, moderation in carbohydrate intake, increasing intake of lean protein, reducing intake of saturated fat and trans fat and reducing intake of cholesterol  Exercise recommendations include moderate aerobic physical activity for 150 minutes/week  No

## 2022-12-26 NOTE — ED PROVIDER NOTE - NS HIV RISK FACTOR YES
Declined
Verbal - The patient responds to verbal stimuli by opening their eyes when someone speaks to them. The patient is not fully oriented to time, place, or person.

## 2023-02-28 ENCOUNTER — APPOINTMENT (OUTPATIENT)
Dept: NEUROLOGY | Facility: CLINIC | Age: 63
End: 2023-02-28

## 2023-08-04 NOTE — ED ADULT NURSE NOTE - PAIN RATING/NUMBER SCALE (0-10): REST
Pt's Richa HODGSON, called and given update on pt condition. All questions and concerns addressed.    10

## 2024-07-23 NOTE — ED ADULT NURSE NOTE - COUGH
Date: 07/25/24  Facilitator(s): Michael Salinas    Method: Group  Attendance: Present  Participation: Active  Patient report of any safety concerns: No  Physical concerns reported: No  Drugs/alcohol reported:Yes  Taking medications as prescribed:Yes  Mood: Appropriate  Affect: Normal  Thought Process: Congruent  Perceptual Problems: None  Speech: Clear/articulate  Behavior/Socialization: Appropriate to Group  Task Performance: Follows directions  Patient Response: Attentive    Group Topic:  Check-In:    Start Time: 8:00 AM  End Time: 9:00 AM  Number of group participants: 3  Patient’s response to group: Patient introduced himself to the group, as today is patient's first day in morning group. He said his children were breaking items around the house yesterday, causing frustration from patient, his significant other and his significant other's mother. He said he got a break later in the day when he dropped his children off at their grandmother's house. Patient said he mowed the lawn yesterday. He admitted he used cannabis last night to help him fall asleep.    Group Topic:  Education Group:    Start Time: 9:00 AM  End Time: 10:00 AM  Number of group participants: 3  Therapeutic intervention(s) introduced to group: The group learned about family systems theory. They discovered the different roles people play in a family system. The group was able to identify these roles and gave some consideration to how this may have affected them and their family in the present. The group members were able to identify their role or roles they played in their family system growing up and how it affects their lives today. They also learned about the differences between a dysfunctional and nurturing family. The group members identified with some of the differences and how this has affected them into adulthood.    Group Topic:  Process Group:    Start Time: 10:00 AM  End Time: 11:00 AM  Number of group participants: 3  Goals  identified: Conflict management approaches: To assist patients in recognizing how they respond to conflict in significant relationships by assisting them in examining ineffective response to conflict as well as the value of being assertive in recovery.  Patient’s response to group: Patient admitted he had a very dysfunction upbringing. He added that his mother struggled with substance use and patient was bullied throughout school. Patient said this significantly contributed to his struggles with substance use. He stated he most identifies with the Scapegoat role, and can see how it affects him to this day. He was encouraged to be more nurturing towards himself to help him heal and move forward.    Michael Salinas BS, CSAC, ICS     dry unproductive

## 2024-12-06 NOTE — ED ADULT TRIAGE NOTE - NS ED TRIAGE AVPU SCALE
Xray Chest 1 View AP/PA
Alert-The patient is alert, awake and responds to voice. The patient is oriented to time, place, and person. The triage nurse is able to obtain subjective information.